# Patient Record
Sex: MALE | Race: WHITE | NOT HISPANIC OR LATINO | Employment: OTHER | URBAN - METROPOLITAN AREA
[De-identification: names, ages, dates, MRNs, and addresses within clinical notes are randomized per-mention and may not be internally consistent; named-entity substitution may affect disease eponyms.]

---

## 2018-05-18 ENCOUNTER — OFFICE VISIT (OUTPATIENT)
Dept: NEUROSURGERY | Facility: CLINIC | Age: 63
End: 2018-05-18

## 2018-05-18 VITALS
WEIGHT: 152 LBS | BODY MASS INDEX: 23.86 KG/M2 | OXYGEN SATURATION: 99 % | HEIGHT: 67 IN | RESPIRATION RATE: 18 BRPM | SYSTOLIC BLOOD PRESSURE: 138 MMHG | DIASTOLIC BLOOD PRESSURE: 76 MMHG | HEART RATE: 90 BPM

## 2018-05-18 DIAGNOSIS — M53.9 MULTILEVEL DEGENERATIVE DISC DISEASE: Primary | ICD-10-CM

## 2018-05-18 DIAGNOSIS — M51.36 BULGING LUMBAR DISC: ICD-10-CM

## 2018-05-18 DIAGNOSIS — F41.9 ANXIETY: ICD-10-CM

## 2018-05-18 PROCEDURE — 99204 OFFICE O/P NEW MOD 45 MIN: CPT | Performed by: NEUROLOGICAL SURGERY

## 2018-05-18 RX ORDER — OXYCODONE HCL 10 MG/1
10 TABLET, FILM COATED, EXTENDED RELEASE ORAL 3 TIMES DAILY PRN
COMMUNITY

## 2018-05-18 RX ORDER — DULOXETIN HYDROCHLORIDE 30 MG/1
30 CAPSULE, DELAYED RELEASE ORAL DAILY
Qty: 30 CAPSULE | Refills: 1 | Status: SHIPPED | OUTPATIENT
Start: 2018-05-18 | End: 2018-06-07 | Stop reason: DRUGHIGH

## 2018-05-18 RX ORDER — MORPHINE SULFATE 100 MG/1
100 TABLET ORAL EVERY 12 HOURS SCHEDULED
COMMUNITY
Start: 2018-05-10

## 2018-05-18 RX ORDER — LISINOPRIL 10 MG/1
10 TABLET ORAL DAILY
COMMUNITY
End: 2018-06-07 | Stop reason: DRUGHIGH

## 2018-05-18 NOTE — PATIENT INSTRUCTIONS
Bring New/Old disc with you to EVERY appointment!!!    Make sure you have the following before your next appointment with .:  -Appointment with    -Another injection with   -CT scan lumbar spine  -Xray of spine (you can have this done the day of your appointment)  -Nerve conduction study

## 2018-05-18 NOTE — PROGRESS NOTES
"  NAME: ARELIS REESE   DOS: 2018  : 1955  PCP: DANIS Flores    Chief Complaint: Back pain   Chief Complaint   Patient presents with   • Back Pain   • Left thigh/leg numbness   • Hx of lumbar discectomy        History of Present Illness:  62 y.o. male   This 62-year-old gentleman with a history of chronic axial back pain for a number of years at times it makes him \"suicidal \"additionally he has pain in the right groin area he denies any symptomatology in the right hip that is significant since his recent hip replacement.  He suffers from chronic depression anxiety issues [place]  He denies any bowel bladder issues proximally 2-3 weeks ago and actually as far as back last summer he is expressing some burning and tingling in his left anterior thigh down in an L2 and L3 poly-radicular type of area he denies any bowel and bladder issues and is absolutely miserable with the pain is worse with standing it's alleviated by laying down    PMHX  Allergies:  No Known Allergies  Medications    Current Outpatient Prescriptions:   •  lisinopril (PRINIVIL,ZESTRIL) 10 MG tablet, Take 10 mg by mouth Daily., Disp: , Rfl:   •  Morphine (MS CONTIN) 100 MG 12 hr tablet, , Disp: , Rfl:   •  oxyCODONE (oxyCONTIN) 10 MG 12 hr tablet, Take 10 mg by mouth Every 12 (Twelve) Hours., Disp: , Rfl:   Past Medical History:  Past Medical History:   Diagnosis Date   • Hypertension    • Low back pain      Past Surgical History:  Past Surgical History:   Procedure Laterality Date   • LUMBAR DISC SURGERY      [facility] ()   • TOTAL HIP ARTHROPLASTY Right 2018    [place], [place]     Social Hx:  Social History   Substance Use Topics   • Smoking status: Former Smoker     Types: Cigarettes     Quit date:    • Smokeless tobacco: Former User   • Alcohol use Yes      Comment: \"Moderately\"     Family Hx:  Family History   Problem Relation Age of Onset   • Heart attack Mother    • Cancer " "Father      Review of Systems:        Review of Systems   Constitutional: Negative for activity change, appetite change, chills, diaphoresis, fatigue, fever and unexpected weight change.   HENT: Negative for congestion, dental problem, drooling, ear discharge, ear pain, facial swelling, hearing loss, mouth sores, nosebleeds, postnasal drip, rhinorrhea, sinus pressure, sneezing, sore throat, tinnitus, trouble swallowing and voice change.    Eyes: Negative for photophobia, pain, discharge, redness, itching and visual disturbance.   Respiratory: Negative for apnea, cough, choking, chest tightness, shortness of breath, wheezing and stridor.    Cardiovascular: Negative for chest pain, palpitations and leg swelling.   Gastrointestinal: Negative for abdominal distention, abdominal pain, anal bleeding, blood in stool, constipation, diarrhea, nausea, rectal pain and vomiting.   Endocrine: Negative for cold intolerance, heat intolerance, polydipsia, polyphagia and polyuria.   Genitourinary: Negative for decreased urine volume, difficulty urinating, dysuria, enuresis, flank pain, frequency, genital sores, hematuria and urgency.   Musculoskeletal: Positive for arthralgias (Left leg pain) and back pain (Pt states his low back pain, and left leg issues get so bad that its \"almost suicidal\"). Negative for gait problem, joint swelling, myalgias, neck pain and neck stiffness.   Skin: Negative for color change, pallor, rash and wound.   Allergic/Immunologic: Negative for environmental allergies, food allergies and immunocompromised state.   Neurological: Positive for numbness (Left thigh/groin. Pt denies any Bowel/Bladder issues). Negative for dizziness, tremors, seizures, syncope, facial asymmetry, speech difficulty, weakness, light-headedness and headaches.   Hematological: Negative for adenopathy. Does not bruise/bleed easily.   Psychiatric/Behavioral: Negative for agitation, behavioral problems, confusion, decreased " concentration, dysphoric mood, hallucinations, self-injury, sleep disturbance and suicidal ideas. The patient is not nervous/anxious and is not hyperactive.    All other systems reviewed and are negative.     I have reviewed this note template and all pertinent parts of the review of systems social, family history, surgical history and medication list      Physical Examination:  Vitals:    05/18/18 1354   BP: 138/76   Pulse: 90   Resp: 18   SpO2: 99%      General Appearance:   Well developed, well nourished, well groomed, alert, and cooperative.  Neurological examination:  Neurologic Exam  Vital signs were reviewed and documented in the chart  Patient appeared in good neurologic function with normal comprehension fluent speech  Mood and affect are normal  Sense of smell deferred    Pupils symmetric equally reactive funduscopic exam not visualized   Visual fields intact to confrontation  Extraocular movements intact  Face motor function is symmetric  Facial sensations normal  Hearing intact to finger rub hearing intact to finger rub  Tongue is midline  Palate symmetric  Swallowing normal  Shoulder shrug normal    Muscle bulk and tone normal  5 out of 5 strength no motor drift  Gait normal intact  Negative Romberg  No clonus long tract signs or myelopathy    Reflexes symmetric decreased knee reflexes  No edema noted and extremities skin appears normal    Straight leg raise sign absent  No signs of intrinsic hip dysfunction  Back is without any lesions or abnormality  Feet are warm and well perfused        Review of Imaging/DATA:  His MRI personally it shows intraforaminal compression with some scoliotic curvature at multiple levels  Diagnoses/Plan:    Mr. Veliz is a 62 y.o. male   This gentleman is suffering from significant intraforaminal disc disease predominantly at 2/3and 3 4 worse on the right side he has severe canal stenosis prior history of lumbar discectomy L3 4 he also has intraforaminal disease at 5 one  on the right probably.    I had an extensive 45 minutes to an hour discussion with him about managing chronic pain.  He's currently taking oral opioids I've recommended her a referral to a pain psychologist I don't think he is truly suicidal in discussions with him but I do think at times he finds himself hopeless dealing with her chronic pain and right him for some Cymbalta and instructed him on how to take it he is taken in the past by report will get a set a long cassette films lumbar flexion extension films as well as a CT scan of lumbar spine and we'll see him back I'll also like him to get evaluated and have a series of lumbar epidural steroid blocks in the meanwhile

## 2018-05-23 ENCOUNTER — HOSPITAL ENCOUNTER (OUTPATIENT)
Dept: GENERAL RADIOLOGY | Facility: HOSPITAL | Age: 63
Discharge: HOME | End: 2018-05-23
Attending: NEUROLOGICAL SURGERY

## 2018-05-23 ENCOUNTER — HOSPITAL ENCOUNTER (OUTPATIENT)
Dept: CT IMAGING | Facility: HOSPITAL | Age: 63
Discharge: HOME | End: 2018-05-23
Attending: NEUROLOGICAL SURGERY | Admitting: NEUROLOGICAL SURGERY

## 2018-05-23 DIAGNOSIS — F41.9 ANXIETY: ICD-10-CM

## 2018-05-23 DIAGNOSIS — M53.9 MULTILEVEL DEGENERATIVE DISC DISEASE: ICD-10-CM

## 2018-05-23 DIAGNOSIS — M51.36 BULGING LUMBAR DISC: ICD-10-CM

## 2018-05-23 PROCEDURE — 72120 X-RAY BEND ONLY L-S SPINE: CPT

## 2018-05-23 PROCEDURE — 72082 X-RAY EXAM ENTIRE SPI 2/3 VW: CPT

## 2018-05-23 PROCEDURE — 72131 CT LUMBAR SPINE W/O DYE: CPT

## 2018-05-25 ENCOUNTER — OFFICE VISIT (OUTPATIENT)
Dept: PSYCHIATRY | Facility: CLINIC | Age: 63
End: 2018-05-25

## 2018-05-25 DIAGNOSIS — F41.1 GAD (GENERALIZED ANXIETY DISORDER): Primary | ICD-10-CM

## 2018-05-25 PROCEDURE — 90791 PSYCH DIAGNOSTIC EVALUATION: CPT | Performed by: PSYCHOLOGIST

## 2018-05-25 NOTE — PROGRESS NOTES
"PROGRESS NOTE    Data:  Martin Veliz is a 62 y.o. male who met with the undersigned for a scheduled individual outpatient therapy session from 10:00 - 10:55am.      Clinical Maneuvering/Intervention:      Pt talked about struggling with chronic pain. He explained that laying flat seems to be the only time he can be out of pain but he has numerous instances of moving and then sparking intense pain. He was very talkative in session, articulate, and forthcoming with personal information. A psychological evaluation was conducted in order to assess past and current level of functioning. Areas assessed included, but were not limited to: perception of social support, perception of ability to face and deal with challenges in life (positive functioning), anxiety symptoms, depressive symptoms, perspective on beliefs/belief system, coping skills for stress, intelligence level, etc. Therapeutic rapport was established. His pain experience and then later, his plan of action to better the quality of his life were discussed in session. Feelings were processed and validated in order to help ground him as he jumped from issue to issue. Interventions conducted today were geared towards pain symptom management, identifying options to improve his life when at first there did not seem to be a way, and deepening sense of purpose in life. A plan of action was articulated and then clarified again in order to empower the pt to feel more in control of his life and diminish anxiety. The notion of \"attacking the issue [chronic pain] from all sides\" was discussed. Education was also provided as to the nature of counseling and what to expect in subsequent sessions. A treatment plan was initiated tailored to meeting pt’s presenting needs. The pt was encouraged to return for additional sessions and agreed to do so.      Mental Status Exam  Hygiene:  good  Dress: normal  Attitude:  Cooperative and proactive  Motor Activity: normal  Speech: " "pressured  Mood:  ancious  Affect:  congruent  Thought Processes: normal  Thought Content:  normal  Suicidal Thoughts:  not endorsed  Homicidal Thoughts:  not endorsed  Crisis Safety Plan: not needed   Hallucinations:  none      Patient's Support Network Includes:  family, friends      Progress toward goal: there is evidence to suggest that he is taking measures to improve the quality of her life including seeking pain treatment and help addressing physical problems. He struggles to maintain anxiety, but is learning ways to do this.      Functional Status: moderate      Prognosis: good with pain treatment/medical procedures to diminish his pain and therapy    Assessment      The pt presented as highly anxious and to be struggling to see hope at times, that his physical health will improve. He is a good candidate for counseling as therapeutic rapport was initiated, he responded positively to interventions today, and he seems open to continue with counseling (at least after receiving additional medical care). He seems determined to get answers to his pain problems and to receive medical treatment before continuing with counseling. He has a history of depression and skills were taught to him today in order for him to use and prevent relapse of depressive symptoms.        Plan      In order to diminish symptoms of anxiety, the pt will work the \"plan of action\" identified today: 1. Follow up with his surgeon in order to receive a diagnosis/treatment of pain/other problems, 2. Continue with being particularly careful in the meantime not overdoing it in physical tasks but still staying physically active in a responsible sense/paying attention to his body (ongoing), 3. Seek pain treatment as advised by his physicians, and 4. Continue with counseling in order to learn coping skills for anxiety and ways to prevent relapse of depression (in the next few weeks or sooner).     Anita Giron, PhD, LP     "

## 2018-06-07 ENCOUNTER — OFFICE VISIT (OUTPATIENT)
Dept: NEUROSURGERY | Facility: CLINIC | Age: 63
End: 2018-06-07

## 2018-06-07 VITALS
WEIGHT: 150 LBS | BODY MASS INDEX: 23.54 KG/M2 | HEIGHT: 67 IN | TEMPERATURE: 97.9 F | SYSTOLIC BLOOD PRESSURE: 122 MMHG | DIASTOLIC BLOOD PRESSURE: 64 MMHG

## 2018-06-07 DIAGNOSIS — M54.16 LUMBAR RADICULOPATHY: ICD-10-CM

## 2018-06-07 DIAGNOSIS — M48.061 SPINAL STENOSIS OF LUMBAR REGION WITHOUT NEUROGENIC CLAUDICATION: Primary | ICD-10-CM

## 2018-06-07 PROCEDURE — 99214 OFFICE O/P EST MOD 30 MIN: CPT | Performed by: NEUROLOGICAL SURGERY

## 2018-06-07 RX ORDER — LISINOPRIL 40 MG/1
40 TABLET ORAL DAILY
Refills: 1 | COMMUNITY
Start: 2018-05-31

## 2018-06-07 RX ORDER — DULOXETIN HYDROCHLORIDE 60 MG/1
60 CAPSULE, DELAYED RELEASE ORAL DAILY
Qty: 30 CAPSULE | Refills: 3 | Status: ON HOLD | OUTPATIENT
Start: 2018-06-07 | End: 2018-07-20

## 2018-06-07 NOTE — PATIENT INSTRUCTIONS
If injections do not help with the pain, please give Jessica a call and we will set you up for a myelogram.

## 2018-06-07 NOTE — PROGRESS NOTES
"  NAME: ARELIS REESE   DOS: 2018  : 1955  PCP: DANIS Flores    Chief Complaint: Follow-up low back pain   Chief Complaint   Patient presents with   • Back Pain     f/u CT/XRAY/PSYCH/EMG/PAIN [facility]       History of Present Illness:  62 y.o. male   62-year-old gentleman with a history of chronic axial back pain with apparently a more acute sounding left L4 perhaps L5 type of disc issue today more previously his left paraspinal L2-L3 type of pain he's had a prior left L3 4 lumbar discectomy    He has seen our pain psychologist and I reviewed the results of the study he was relatively unremarkable, he has not had any lumbar injections recently, he has been on the Cymbalta which seems to be helping with some chronicity of his lumbar spinal issues that he still complaining of daily left lower extremity pain    PMHX  Allergies:  No Known Allergies  Medications    Current Outpatient Prescriptions:   •  DULoxetine (CYMBALTA) 30 MG capsule, Take 1 capsule by mouth Daily., Disp: 30 capsule, Rfl: 1  •  lisinopril (PRINIVIL,ZESTRIL) 40 MG tablet, Take 40 mg by mouth Daily., Disp: , Rfl: 1  •  Morphine (MS CONTIN) 100 MG 12 hr tablet, , Disp: , Rfl:   •  oxyCODONE (oxyCONTIN) 10 MG 12 hr tablet, Take 10 mg by mouth Every 12 (Twelve) Hours., Disp: , Rfl:   Past Medical History:  Past Medical History:   Diagnosis Date   • Hypertension    • Low back pain      Past Surgical History:  Past Surgical History:   Procedure Laterality Date   • LUMBAR DISC SURGERY      [facility] ()   • TOTAL HIP ARTHROPLASTY Right 2018    [place], [place]     Social Hx:  Social History   Substance Use Topics   • Smoking status: Former Smoker     Types: Cigarettes     Quit date:    • Smokeless tobacco: Former User   • Alcohol use Yes      Comment: \"Moderately\"     Family Hx:  Family History   Problem Relation Age of Onset   • Heart attack Mother    • Cancer Father      Review of Systems:        Review of " Systems   Constitutional: Negative for activity change, appetite change, chills, diaphoresis, fatigue, fever and unexpected weight change.   HENT: Negative for congestion, dental problem, drooling, ear discharge, ear pain, facial swelling, hearing loss, mouth sores, nosebleeds, postnasal drip, rhinorrhea, sinus pressure, sneezing, sore throat, tinnitus, trouble swallowing and voice change.    Eyes: Negative for photophobia, pain, discharge, redness, itching and visual disturbance.   Respiratory: Negative for apnea, cough, choking, chest tightness, shortness of breath, wheezing and stridor.    Cardiovascular: Negative for chest pain, palpitations and leg swelling.   Gastrointestinal: Negative for abdominal distention, abdominal pain, anal bleeding, blood in stool, constipation, diarrhea, nausea, rectal pain and vomiting.   Endocrine: Negative for cold intolerance, heat intolerance, polydipsia, polyphagia and polyuria.   Genitourinary: Negative for decreased urine volume, difficulty urinating, dysuria, enuresis, flank pain, frequency, genital sores, hematuria and urgency.   Musculoskeletal: Negative for arthralgias, back pain, gait problem, joint swelling, myalgias, neck pain and neck stiffness.   Skin: Negative for color change, pallor, rash and wound.   Allergic/Immunologic: Negative for environmental allergies, food allergies and immunocompromised state.   Neurological: Negative for dizziness, tremors, seizures, syncope, facial asymmetry, speech difficulty, weakness, light-headedness, numbness and headaches.   Hematological: Negative for adenopathy. Does not bruise/bleed easily.   Psychiatric/Behavioral: Negative for agitation, behavioral problems, confusion, decreased concentration, dysphoric mood, hallucinations, self-injury, sleep disturbance and suicidal ideas. The patient is not nervous/anxious and is not hyperactive.    All other systems reviewed and are negative.     I have reviewed this note template and  all pertinent parts of the review of systems social, family history, surgical history and medication list      Physical Examination:  Vitals:    06/07/18 1249   BP: 122/64   Temp: 97.9 °F (36.6 °C)      General Appearance:   Well developed, well nourished, well groomed, alert, and cooperative.  Neurological examination:  Neurologic Exam  He is awake alert and follows commands he appears today very stable    He has good strength in his lower extremities with no weakness  Overall exam is unchanged well-healed incision with left paraspinal tenderness at the top of the SI region is no straight leg raise sign no signs of intrinsic hip dysfunction  Review of Imaging/DATA:    Nathalia reviewed his MRI again shows pretty aggressive L2-3 and L3 4 intraforaminal disease is a lot of far lateral components in the probably is widening of the facet complex at L4 5    His CT scan shows diffuse calcification from this    EMG shows L4-L5 radiculopathy lumbar flexion extension shows no evidence of significant listhesis  Mr. Veliz is a 62 y.o. male     He presents with a complex history of axial back pain left-sided lower extremity symptomatology predominant and I've had an extensive at least 30 minute discussion with him about the pathway towards how surgery may help him.  From my standpoint I recommended ongoing interventional pain management therapy I see nothing that should make him paralyzed I recommended that he go back to a see Vladimir to receive some lumbar epidural block said be interested in the location perhaps L4 5 transforaminal versus an L3 transforaminal block to see if this would help.    Also in consideration is for a implantable pain pump versus a spinal stimulator either these are reasonable if these 2 options do not provide the relief needed I recommended a lumbar myelogram I explained to him that we can make surgical decisions regarding the results of this study I also told him that I welcomed second opinions  regarding reconstructive options if we got to that point as L2 to S1 fusions are relatively significant surgeries associated with a modest complication rate and variable surgery outcomes.  I  The risks benefits and expected outcome we'll see him back with a myelogram if he needs to proceed with any sort of surgical care.

## 2018-07-17 ENCOUNTER — PREP FOR SURGERY (OUTPATIENT)
Dept: OTHER | Facility: HOSPITAL | Age: 63
End: 2018-07-17

## 2018-07-17 DIAGNOSIS — M54.42 CHRONIC MIDLINE LOW BACK PAIN WITH LEFT-SIDED SCIATICA: Primary | ICD-10-CM

## 2018-07-17 DIAGNOSIS — M51.36 BULGING LUMBAR DISC: ICD-10-CM

## 2018-07-17 DIAGNOSIS — M54.16 LUMBAR RADICULOPATHY: ICD-10-CM

## 2018-07-17 DIAGNOSIS — M48.061 SPINAL STENOSIS OF LUMBAR REGION WITHOUT NEUROGENIC CLAUDICATION: Primary | ICD-10-CM

## 2018-07-17 DIAGNOSIS — G89.29 CHRONIC MIDLINE LOW BACK PAIN WITH LEFT-SIDED SCIATICA: Primary | ICD-10-CM

## 2018-07-17 DIAGNOSIS — M53.9 MULTILEVEL DEGENERATIVE DISC DISEASE: ICD-10-CM

## 2018-07-20 ENCOUNTER — APPOINTMENT (OUTPATIENT)
Dept: CT IMAGING | Facility: HOSPITAL | Age: 63
End: 2018-07-20

## 2018-07-20 ENCOUNTER — HOSPITAL ENCOUNTER (OUTPATIENT)
Facility: HOSPITAL | Age: 63
Setting detail: HOSPITAL OUTPATIENT SURGERY
Discharge: HOME | End: 2018-07-20
Attending: RADIOLOGY | Admitting: NEUROLOGICAL SURGERY

## 2018-07-20 VITALS
RESPIRATION RATE: 18 BRPM | OXYGEN SATURATION: 97 % | DIASTOLIC BLOOD PRESSURE: 93 MMHG | SYSTOLIC BLOOD PRESSURE: 153 MMHG | WEIGHT: 151.01 LBS | HEIGHT: 68 IN | HEART RATE: 68 BPM | BODY MASS INDEX: 22.89 KG/M2 | TEMPERATURE: 98.6 F

## 2018-07-20 DIAGNOSIS — M51.36 BULGING LUMBAR DISC: ICD-10-CM

## 2018-07-20 DIAGNOSIS — M48.061 SPINAL STENOSIS OF LUMBAR REGION WITHOUT NEUROGENIC CLAUDICATION: ICD-10-CM

## 2018-07-20 DIAGNOSIS — M54.16 LUMBAR RADICULOPATHY: ICD-10-CM

## 2018-07-20 DIAGNOSIS — M53.9 MULTILEVEL DEGENERATIVE DISC DISEASE: ICD-10-CM

## 2018-07-20 PROCEDURE — 72132 CT LUMBAR SPINE W/DYE: CPT

## 2018-07-20 PROCEDURE — 0 IOPAMIDOL 41 % SOLUTION: Performed by: RADIOLOGY

## 2018-07-20 PROCEDURE — 62304 MYELOGRAPHY LUMBAR INJECTION: CPT | Performed by: RADIOLOGY

## 2018-07-20 RX ORDER — LIDOCAINE HYDROCHLORIDE 10 MG/ML
INJECTION, SOLUTION INFILTRATION; PERINEURAL AS NEEDED
Status: DISCONTINUED | OUTPATIENT
Start: 2018-07-20 | End: 2018-07-20 | Stop reason: HOSPADM

## 2018-07-20 NOTE — H&P
"NAME: EVELYNE REESE  : 1955  PCP: DANIS Flores  Attending MD: Robetr Mullins MD    Date of Admission:  2018  Date of Service: 2018    History of Present Illness:  62 y.o.  male with prior L3/4 discectomy.  He has chronic axial pain and more acute left sided L4-L5 radiculopathy.    Past Medical History:  Past Medical History:   Diagnosis Date   • Hypertension    • Low back pain        Past Surgical History:  Past Surgical History:   Procedure Laterality Date   • LUMBAR DISC SURGERY      [facility] ()   • TOTAL HIP ARTHROPLASTY Right 2018    [place], [place]         Medications  Prescriptions Prior to Admission   Medication Sig Dispense Refill Last Dose   • DULoxetine (CYMBALTA) 60 MG capsule Take 1 capsule by mouth Daily. 30 capsule 3    • lisinopril (PRINIVIL,ZESTRIL) 40 MG tablet Take 40 mg by mouth Daily.  1 Taking   • Morphine (MS CONTIN) 100 MG 12 hr tablet    Taking   • oxyCODONE (oxyCONTIN) 10 MG 12 hr tablet Take 10 mg by mouth Every 12 (Twelve) Hours.   Taking       Allergies:  No Known Allergies    Social Hx:  Social History     Social History   • Marital status:      Spouse name: N/A   • Number of children: N/A   • Years of education: N/A     Occupational History   • Not on file.     Social History Main Topics   • Smoking status: Former Smoker     Types: Cigarettes     Quit date:    • Smokeless tobacco: Former User   • Alcohol use Yes      Comment: \"Moderately\"   • Drug use: No   • Sexual activity: Defer     Other Topics Concern   • Not on file     Social History Narrative   • No narrative on file       Family Hx:  Family History   Problem Relation Age of Onset   • Heart attack Mother    • Cancer Father        Review of Imaging:  No new imaging    Laboratory Result:  No results found for: WBC, HGB, HCT, MCV, PLT  No results found for: GLUCOSE, CALCIUM, NA, K, CO2, CL, BUN, CREATININE, EGFRIFAFRI, EGFRIFNONA, BCR, ANIONGAP  No results " found for: HGBA1C  No results found for: CHOL, CHLPL, TRIG, HDL, LDL, LDLDIRECT    Physical Examination:  There were no vitals filed for this visit.     General Appearance:   Well developed, well nourished, well groomed, alert, and cooperative.  Cardiovascular: Regular rate and rhythm. No carotid bruits    Neurological examination:  Neurologic Exam  He is awake alert and follows commands he appears today very stable     He has good strength in his lower extremities with no weakness  Overall exam is unchanged well-healed incision with left paraspinal tenderness at the top of the SI region is no straight leg raise sign no signs of intrinsic hip dysfunction    Diagnoses/Plan:    Mr. Veliz is a 62 y.o. male complex history of axial back pain and left-sided lower extremity radiculopathy.  He presents for lumbar myelography to evaluate his radicular complaints for possible intervention

## 2018-07-26 ENCOUNTER — OFFICE VISIT (OUTPATIENT)
Dept: NEUROSURGERY | Facility: CLINIC | Age: 63
End: 2018-07-26

## 2018-07-26 VITALS
DIASTOLIC BLOOD PRESSURE: 88 MMHG | SYSTOLIC BLOOD PRESSURE: 144 MMHG | BODY MASS INDEX: 22.88 KG/M2 | HEIGHT: 68 IN | TEMPERATURE: 97.2 F | WEIGHT: 151 LBS

## 2018-07-26 DIAGNOSIS — M53.9 MULTILEVEL DEGENERATIVE DISC DISEASE: Primary | ICD-10-CM

## 2018-07-26 DIAGNOSIS — M48.061 SPINAL STENOSIS OF LUMBAR REGION WITHOUT NEUROGENIC CLAUDICATION: ICD-10-CM

## 2018-07-26 PROCEDURE — 99214 OFFICE O/P EST MOD 30 MIN: CPT | Performed by: NEUROLOGICAL SURGERY

## 2018-07-26 NOTE — PROGRESS NOTES
"  NAME: EVELYNE REESE   DOS: 2018  : 1955  PCP: DANIS Flores    Chief Complaint:  Back and left leg pain  Chief Complaint   Patient presents with   • F/u Myelo       History of Present Illness:  62 y.o. male     62-year-old well-known to maintain follow-up after myelogram still with back and left lower pain the pain radiates down the leg and an L2-L3 and L4 poly-radicular pattern he has neurogenic claudication with pain alleviated by rest she denies any other symptomatology.  He's had injections that were temporary nothing that sustained Cymbalta he has taken himself off of that it did help shortly but has not had and during relief he denies any other symptomatology and is here for evaluation he is on chronic opioids  PMHX  Allergies:  No Known Allergies  Medications    Current Outpatient Prescriptions:   •  lisinopril (PRINIVIL,ZESTRIL) 40 MG tablet, Take 40 mg by mouth Daily., Disp: , Rfl: 1  •  Morphine (MS CONTIN) 100 MG 12 hr tablet, Take 100 mg by mouth Every 12 (Twelve) Hours., Disp: , Rfl:   •  oxyCODONE (oxyCONTIN) 10 MG 12 hr tablet, Take 10 mg by mouth 3 (Three) Times a Day As Needed., Disp: , Rfl:   Past Medical History:  Past Medical History:   Diagnosis Date   • Hypertension    • Low back pain      Past Surgical History:  Past Surgical History:   Procedure Laterality Date   • LUMBAR DISC SURGERY      [facility] ()   • [place] MYELOGRAPHY VIA LUMBAR INJECT RS&I LUMBOSACRAL N/A 2018    Procedure: IR myelogram, lumbar;  Surgeon: Robert Mullins MD;  Location: [facility] INVASIVE LOCATION;  Service: Interventional Radiology   • TOTAL HIP ARTHROPLASTY Right 2018    [place], [place]     Social Hx:  Social History   Substance Use Topics   • Smoking status: Former Smoker     Types: Cigarettes     Quit date:    • Smokeless tobacco: Former User   • Alcohol use Yes      Comment: \"Moderately\"     Family Hx:  Family History   Problem Relation Age of Onset   • " Heart attack Mother    • Cancer Father      Review of Systems:        Review of Systems   Constitutional: Negative for activity change, appetite change, chills, diaphoresis, fatigue, fever and unexpected weight change.   HENT: Negative for congestion, dental problem, drooling, ear discharge, ear pain, facial swelling, hearing loss, mouth sores, nosebleeds, postnasal drip, rhinorrhea, sinus pressure, sneezing, sore throat, tinnitus, trouble swallowing and voice change.    Eyes: Negative for photophobia, pain, discharge, redness, itching and visual disturbance.   Respiratory: Negative for apnea, cough, choking, chest tightness, shortness of breath, wheezing and stridor.    Cardiovascular: Negative for chest pain, palpitations and leg swelling.   Gastrointestinal: Negative for abdominal distention, abdominal pain, anal bleeding, blood in stool, constipation, diarrhea, nausea, rectal pain and vomiting.   Endocrine: Negative for cold intolerance, heat intolerance, polydipsia, polyphagia and polyuria.   Genitourinary: Negative for decreased urine volume, difficulty urinating, dysuria, enuresis, flank pain, frequency, genital sores, hematuria and urgency.   Musculoskeletal: Positive for back pain. Negative for arthralgias, gait problem, joint swelling, myalgias, neck pain and neck stiffness.   Skin: Negative for color change, pallor, rash and wound.   Allergic/Immunologic: Negative for environmental allergies, food allergies and immunocompromised state.   Neurological: Positive for weakness. Negative for dizziness, tremors, seizures, syncope, facial asymmetry, speech difficulty, light-headedness, numbness and headaches.   Hematological: Negative for adenopathy. Does not bruise/bleed easily.   Psychiatric/Behavioral: Positive for decreased concentration. Negative for agitation, behavioral problems, confusion, dysphoric mood, hallucinations, self-injury, sleep disturbance and suicidal ideas. The patient is not  nervous/anxious and is not hyperactive.    All other systems reviewed and are negative.       I have reviewed this note template and all pertinent parts of the review of systems social, family history, surgical history and medication list    Physical Examination:  Vitals:    07/26/18 1522   BP: 144/88   Temp: 97.2 °F (36.2 °C)      General Appearance:   Well developed, well nourished, well groomed, alert, and cooperative.  Neurological examination:  Neurologic Exam  His exam is stable no signs of intrinsic hip dysfunction he deathly has pain in the left lower extremity    Review of Imaging/DATA:  I reviewed his MRI myelogram etc.  Diagnoses/Plan:    Mr. Veliz is a 62 y.o. male   He has a complicated history of chronic pain he's on presurgical opioids that had extensive discussions with him for my standpoint I think he is a candidate for surgery I think he needs intraforaminal decompression at L2-3 and L3 4 he does have right-sided intraforaminal disease which could be left alone I think at the minimum I did recommend a 2 level decompression 234 lumbar interbody fusion with instrumentation of the left side spent at least an hour with him talking about the risk factors of the surgery and that he does have intraforaminal disease down below all reviewed his films a bit further but he may be a candidate to tie this in and down to S1 at some point, the initial option fail  The reoperation rate from complex surgeries like this I encouraged him to get a secondary opinion prior to partaking in any surgery and I recommended visiting Dr. Paz at  just to see what he thinks of this gentleman's complexity at all think his SVA is that positive and so hopefully I think most of his disease is interforaminal and a short segment fusion I expand the risks benefits and expected outcome we will see him back with a family member who could help him after surgery.

## 2018-07-31 ENCOUNTER — TELEPHONE (OUTPATIENT)
Dept: NEUROSURGERY | Facility: CLINIC | Age: 63
End: 2018-07-31

## 2018-09-24 ENCOUNTER — OFFICE VISIT (OUTPATIENT)
Dept: NEUROSURGERY | Facility: CLINIC | Age: 63
End: 2018-09-24

## 2018-09-24 ENCOUNTER — PREP FOR SURGERY (OUTPATIENT)
Dept: OTHER | Facility: HOSPITAL | Age: 63
End: 2018-09-24

## 2018-09-24 DIAGNOSIS — M41.20 SCOLIOSIS (AND KYPHOSCOLIOSIS), IDIOPATHIC: Primary | ICD-10-CM

## 2018-09-24 DIAGNOSIS — M48.061 SPINAL STENOSIS OF LUMBAR REGION WITHOUT NEUROGENIC CLAUDICATION: Primary | ICD-10-CM

## 2018-09-24 PROCEDURE — 99213 OFFICE O/P EST LOW 20 MIN: CPT | Performed by: NEUROLOGICAL SURGERY

## 2018-09-24 RX ORDER — ACETAMINOPHEN 500 MG
1000 TABLET ORAL ONCE
Status: CANCELLED | OUTPATIENT
Start: 2018-09-24 | End: 2018-09-24

## 2018-09-24 RX ORDER — OXYCODONE HCL 10 MG/1
10 TABLET, FILM COATED, EXTENDED RELEASE ORAL ONCE
Status: CANCELLED | OUTPATIENT
Start: 2018-09-24 | End: 2018-09-24

## 2018-09-24 RX ORDER — FAMOTIDINE 20 MG/1
20 TABLET, FILM COATED ORAL
Status: CANCELLED | OUTPATIENT
Start: 2018-09-24

## 2018-09-24 RX ORDER — CHLORHEXIDINE GLUCONATE 4 G/100ML
SOLUTION TOPICAL
Qty: 120 ML | Refills: 0 | Status: SHIPPED | OUTPATIENT
Start: 2018-09-24 | End: 2018-10-20 | Stop reason: HOSPADM

## 2018-09-24 RX ORDER — PREGABALIN 75 MG/1
75 CAPSULE ORAL ONCE
Status: CANCELLED | OUTPATIENT
Start: 2018-09-24 | End: 2018-09-24

## 2018-09-24 RX ORDER — CEFAZOLIN SODIUM 2 G/100ML
2 INJECTION, SOLUTION INTRAVENOUS ONCE
Status: CANCELLED | OUTPATIENT
Start: 2018-09-24 | End: 2018-09-24

## 2018-09-24 RX ORDER — IBUPROFEN 800 MG/1
800 TABLET ORAL ONCE
Status: CANCELLED | OUTPATIENT
Start: 2018-09-24 | End: 2018-09-24

## 2018-09-24 RX ORDER — SODIUM CHLORIDE, SODIUM LACTATE, POTASSIUM CHLORIDE, CALCIUM CHLORIDE 600; 310; 30; 20 MG/100ML; MG/100ML; MG/100ML; MG/100ML
9 INJECTION, SOLUTION INTRAVENOUS CONTINUOUS
Status: CANCELLED | OUTPATIENT
Start: 2018-09-24

## 2018-09-24 NOTE — PROGRESS NOTES
"  NAME: EVELYNE REESE   DOS: 2018  : 1955  PCP: Kelsey Shah APRN    Chief Complaint:  No chief complaint on file.      History of Present Illness:  63 y.o. male   Follow-up back pain and left leg pain    PMHX  Allergies:  No Known Allergies  Medications    Current Outpatient Prescriptions:   •  lisinopril (PRINIVIL,ZESTRIL) 40 MG tablet, Take 40 mg by mouth Daily., Disp: , Rfl: 1  •  Morphine (MS CONTIN) 100 MG 12 hr tablet, Take 100 mg by mouth Every 12 (Twelve) Hours., Disp: , Rfl:   •  oxyCODONE (oxyCONTIN) 10 MG 12 hr tablet, Take 10 mg by mouth 3 (Three) Times a Day As Needed., Disp: , Rfl:   Past Medical History:  Past Medical History:   Diagnosis Date   • Hypertension    • Low back pain      Past Surgical History:  Past Surgical History:   Procedure Laterality Date   • LUMBAR DISC SURGERY      [facility] ()   • [place] MYELOGRAPHY VIA LUMBAR INJECT RS&I LUMBOSACRAL N/A 2018    Procedure: IR myelogram, lumbar;  Surgeon: Robert Mullins MD;  Location: [facility] INVASIVE LOCATION;  Service: Interventional Radiology   • TOTAL HIP ARTHROPLASTY Right 2018    [place], [place]     Social Hx:  Social History   Substance Use Topics   • Smoking status: Former Smoker     Types: Cigarettes     Quit date:    • Smokeless tobacco: Former User   • Alcohol use Yes      Comment: \"Moderately\"     Family Hx:  Family History   Problem Relation Age of Onset   • Heart attack Mother    • Cancer Father      Review of Systems:        Review of Systems         Physical Examination:  There were no vitals filed for this visit.   General Appearance:   Well developed, well nourished, well groomed, alert, and cooperative.  Neurological examination:  Neurologic Exam  Exam is intact he's 5 out of 5 no evidence of weakness    Review of Imaging/DATA:  Reviewed all his scans again personally he has intraforaminal disease LEFT L2-3 L3 4  Diagnoses/Plan:    Mr. Reese is a 63 y.o. male   I " discussed with the gentleman again the care he has received a second opinion given the complexities of his spine and for my standpoint I recommended an L2-3 for lumbar fusion he does have intraforaminal disease on the right at the L4 5 and L5-S1 however he is currently asymptomatic from this.  I told him that there is about a 20% chance she'll be back within 5 years for extension of his lumbar construct but the risk and benefits would dictate a shorter surgery if we stick to the 2 levels in his predominantly left sided symptoms.  We'll make arrangements for a 2 level lumbar interbody fusion L2-3 for I've explained the risks benefits and expected outcome and counseled him on his recovery

## 2018-09-25 ENCOUNTER — HOSPITAL ENCOUNTER (OUTPATIENT)
Facility: HOSPITAL | Age: 63
Setting detail: SURGERY ADMIT
End: 2018-09-25
Attending: NEUROLOGICAL SURGERY | Admitting: NEUROLOGICAL SURGERY

## 2018-09-25 PROBLEM — M41.20 SCOLIOSIS (AND KYPHOSCOLIOSIS), IDIOPATHIC: Status: ACTIVE | Noted: 2018-09-25

## 2018-10-08 ENCOUNTER — APPOINTMENT (OUTPATIENT)
Dept: PREADMISSION TESTING | Facility: HOSPITAL | Age: 63
End: 2018-10-08

## 2018-10-15 ENCOUNTER — APPOINTMENT (OUTPATIENT)
Dept: PREADMISSION TESTING | Facility: HOSPITAL | Age: 63
End: 2018-10-15

## 2018-10-15 VITALS — WEIGHT: 152.2 LBS | HEIGHT: 68 IN | BODY MASS INDEX: 23.07 KG/M2

## 2018-10-15 DIAGNOSIS — M41.20 SCOLIOSIS (AND KYPHOSCOLIOSIS), IDIOPATHIC: ICD-10-CM

## 2018-10-15 LAB
ANION GAP SERPL CALCULATED.3IONS-SCNC: 10 MMOL/L (ref 3–11)
BUN BLD-MCNC: 21 MG/DL (ref 9–23)
BUN/CREAT SERPL: 22.3 (ref 7–25)
CALCIUM SPEC-SCNC: 9.7 MG/DL (ref 8.7–10.4)
CHLORIDE SERPL-SCNC: 95 MMOL/L (ref 99–109)
CO2 SERPL-SCNC: 23 MMOL/L (ref 20–31)
CREAT BLD-MCNC: 0.94 MG/DL (ref 0.6–1.3)
DEPRECATED RDW RBC AUTO: 43.6 FL (ref 37–54)
ERYTHROCYTE [DISTWIDTH] IN BLOOD BY AUTOMATED COUNT: 13.2 % (ref 11.3–14.5)
GFR SERPL CREATININE-BSD FRML MDRD: 81 ML/MIN/1.73
GLUCOSE BLD-MCNC: 95 MG/DL (ref 70–100)
HBA1C MFR BLD: 5.7 % (ref 4.8–5.6)
HCT VFR BLD AUTO: 42.9 % (ref 38.9–50.9)
HGB BLD-MCNC: 14 G/DL (ref 13.1–17.5)
MCH RBC QN AUTO: 29.4 PG (ref 27–31)
MCHC RBC AUTO-ENTMCNC: 32.6 G/DL (ref 32–36)
MCV RBC AUTO: 90.1 FL (ref 80–99)
MRSA DNA SPEC QL NAA+PROBE: NEGATIVE
PLATELET # BLD AUTO: 334 10*3/MM3 (ref 150–450)
PMV BLD AUTO: 9 FL (ref 6–12)
POTASSIUM BLD-SCNC: 5.2 MMOL/L (ref 3.5–5.5)
RBC # BLD AUTO: 4.76 10*6/MM3 (ref 4.2–5.76)
SODIUM BLD-SCNC: 128 MMOL/L (ref 132–146)
WBC NRBC COR # BLD: 9.08 10*3/MM3 (ref 3.5–10.8)

## 2018-10-15 PROCEDURE — 85027 COMPLETE CBC AUTOMATED: CPT | Performed by: ANESTHESIOLOGY

## 2018-10-15 PROCEDURE — 80048 BASIC METABOLIC PNL TOTAL CA: CPT | Performed by: NEUROLOGICAL SURGERY

## 2018-10-15 PROCEDURE — 36415 COLL VENOUS BLD VENIPUNCTURE: CPT

## 2018-10-15 PROCEDURE — 93010 ELECTROCARDIOGRAM REPORT: CPT | Performed by: INTERNAL MEDICINE

## 2018-10-15 PROCEDURE — 93005 ELECTROCARDIOGRAM TRACING: CPT

## 2018-10-15 PROCEDURE — 83036 HEMOGLOBIN GLYCOSYLATED A1C: CPT | Performed by: ANESTHESIOLOGY

## 2018-10-15 PROCEDURE — 87641 MR-STAPH DNA AMP PROBE: CPT | Performed by: ANESTHESIOLOGY

## 2018-10-15 NOTE — DISCHARGE INSTRUCTIONS

## 2018-10-16 ENCOUNTER — ANESTHESIA EVENT (OUTPATIENT)
Dept: PERIOP | Facility: HOSPITAL | Age: 63
End: 2018-10-16

## 2018-10-17 ENCOUNTER — APPOINTMENT (OUTPATIENT)
Dept: GENERAL RADIOLOGY | Facility: HOSPITAL | Age: 63
End: 2018-10-17
Attending: NEUROLOGICAL SURGERY

## 2018-10-17 ENCOUNTER — APPOINTMENT (OUTPATIENT)
Dept: GENERAL RADIOLOGY | Facility: HOSPITAL | Age: 63
End: 2018-10-17

## 2018-10-17 ENCOUNTER — ANESTHESIA (OUTPATIENT)
Dept: PERIOP | Facility: HOSPITAL | Age: 63
End: 2018-10-17

## 2018-10-17 ENCOUNTER — HOSPITAL ENCOUNTER (INPATIENT)
Facility: HOSPITAL | Age: 63
LOS: 3 days | Discharge: HOME | End: 2018-10-20
Attending: NEUROLOGICAL SURGERY | Admitting: NEUROLOGICAL SURGERY

## 2018-10-17 DIAGNOSIS — Z74.09 IMPAIRED MOBILITY AND ADLS: ICD-10-CM

## 2018-10-17 DIAGNOSIS — Z74.09 IMPAIRED FUNCTIONAL MOBILITY, BALANCE, GAIT, AND ENDURANCE: Primary | ICD-10-CM

## 2018-10-17 DIAGNOSIS — Z78.9 IMPAIRED MOBILITY AND ADLS: ICD-10-CM

## 2018-10-17 PROBLEM — M43.16 SPONDYLOLISTHESIS OF LUMBAR REGION: Status: ACTIVE | Noted: 2018-10-17

## 2018-10-17 PROBLEM — M43.00 ACQUIRED SPONDYLOLYSIS: Status: ACTIVE | Noted: 2018-10-17

## 2018-10-17 PROCEDURE — 76001 HC FLUORO GREATER THAN 1 HOUR: CPT

## 2018-10-17 PROCEDURE — 25010000002 HYDROMORPHONE 1 MG/ML SOLUTION: Performed by: ANESTHESIOLOGY

## 2018-10-17 PROCEDURE — 25010000002 NEOSTIGMINE 10 MG/10ML SOLUTION: Performed by: NURSE ANESTHETIST, CERTIFIED REGISTERED

## 2018-10-17 PROCEDURE — 25010000002 ONDANSETRON PER 1 MG: Performed by: NURSE ANESTHETIST, CERTIFIED REGISTERED

## 2018-10-17 PROCEDURE — C1713 ANCHOR/SCREW BN/BN,TIS/BN: HCPCS | Performed by: NEUROLOGICAL SURGERY

## 2018-10-17 PROCEDURE — 25010000002 FENTANYL CITRATE (PF) 100 MCG/2ML SOLUTION: Performed by: NURSE ANESTHETIST, CERTIFIED REGISTERED

## 2018-10-17 PROCEDURE — 22633 ARTHRD CMBN 1NTRSPC LUMBAR: CPT | Performed by: NEUROLOGICAL SURGERY

## 2018-10-17 PROCEDURE — 25010000002 PHENYLEPHRINE PER 1 ML: Performed by: NURSE ANESTHETIST, CERTIFIED REGISTERED

## 2018-10-17 PROCEDURE — 22633 ARTHRD CMBN 1NTRSPC LUMBAR: CPT | Performed by: PHYSICIAN ASSISTANT

## 2018-10-17 PROCEDURE — 22842 INSERT SPINE FIXATION DEVICE: CPT | Performed by: NEUROLOGICAL SURGERY

## 2018-10-17 PROCEDURE — 25010000002 DEXAMETHASONE SODIUM PHOSPHATE 10 MG/ML SOLUTION 1 ML VIAL: Performed by: NEUROLOGICAL SURGERY

## 2018-10-17 PROCEDURE — 22634 ARTHRD CMBN 1NTRSPC EA ADDL: CPT | Performed by: NEUROLOGICAL SURGERY

## 2018-10-17 PROCEDURE — 22853 INSJ BIOMECHANICAL DEVICE: CPT | Performed by: PHYSICIAN ASSISTANT

## 2018-10-17 PROCEDURE — 25010000002 CEFAZOLIN PER 500 MG: Performed by: NEUROLOGICAL SURGERY

## 2018-10-17 PROCEDURE — 25010000002 HYDROMORPHONE PER 4 MG: Performed by: NURSE ANESTHETIST, CERTIFIED REGISTERED

## 2018-10-17 PROCEDURE — 25010000002 PROPOFOL 1000 MG/ML EMULSION: Performed by: NURSE ANESTHETIST, CERTIFIED REGISTERED

## 2018-10-17 PROCEDURE — 61783 SCAN PROC SPINAL: CPT | Performed by: NEUROLOGICAL SURGERY

## 2018-10-17 PROCEDURE — 22634 ARTHRD CMBN 1NTRSPC EA ADDL: CPT | Performed by: PHYSICIAN ASSISTANT

## 2018-10-17 PROCEDURE — 22853 INSJ BIOMECHANICAL DEVICE: CPT | Performed by: NEUROLOGICAL SURGERY

## 2018-10-17 PROCEDURE — 25010000002 DEXAMETHASONE PER 1 MG: Performed by: NURSE ANESTHETIST, CERTIFIED REGISTERED

## 2018-10-17 PROCEDURE — 0SG10AJ FUSION OF 2 OR MORE LUMBAR VERTEBRAL JOINTS WITH INTERBODY FUSION DEVICE, POSTERIOR APPROACH, ANTERIOR COLUMN, OPEN APPROACH: ICD-10-PCS | Performed by: NEUROLOGICAL SURGERY

## 2018-10-17 PROCEDURE — 25010000002 HYDROMORPHONE 1 MG/ML SOLUTION: Performed by: NEUROLOGICAL SURGERY

## 2018-10-17 PROCEDURE — 22842 INSERT SPINE FIXATION DEVICE: CPT | Performed by: PHYSICIAN ASSISTANT

## 2018-10-17 PROCEDURE — 25010000003 CEFAZOLIN IN DEXTROSE 2-4 GM/100ML-% SOLUTION: Performed by: NEUROLOGICAL SURGERY

## 2018-10-17 PROCEDURE — 76000 FLUOROSCOPY <1 HR PHYS/QHP: CPT

## 2018-10-17 PROCEDURE — 25010000002 BUPRENORPHINE PER 0.1 MG: Performed by: NEUROLOGICAL SURGERY

## 2018-10-17 PROCEDURE — 25010000002 PROPOFOL 10 MG/ML EMULSION: Performed by: NURSE ANESTHETIST, CERTIFIED REGISTERED

## 2018-10-17 PROCEDURE — 25010000002 HYDROMORPHONE PER 4 MG: Performed by: ANESTHESIOLOGY

## 2018-10-17 DEVICE — DBM T42200 2.5CMX10CM 2 EACH GRAFTON MAT
Type: IMPLANTABLE DEVICE | Site: SPINE LUMBAR | Status: FUNCTIONAL
Brand: GRAFTON®AND GRAFTON PLUS®DEMINERALIZED BONE MATRIX (DBM)

## 2018-10-17 DEVICE — WAX BONE HEMO AESCULAP 2.5GM: Type: IMPLANTABLE DEVICE | Site: SPINE LUMBAR | Status: FUNCTIONAL

## 2018-10-17 DEVICE — SCREW 54840015550 CN MAS 5.5X50 CC
Type: IMPLANTABLE DEVICE | Site: SPINE LUMBAR | Status: FUNCTIONAL
Brand: CD HORIZON® SPINAL SYSTEM

## 2018-10-17 DEVICE — CAGE 2660924 WAVE D 9MM X 24MM 6DEG
Type: IMPLANTABLE DEVICE | Site: SPINE LUMBAR | Status: FUNCTIONAL
Brand: WAVE D SPINAL SYSTEM

## 2018-10-17 DEVICE — SEALANT WND FIBRIN TISSEEL VAPOR/HEAT/PREFIL/SYR 10ML: Type: IMPLANTABLE DEVICE | Status: FUNCTIONAL

## 2018-10-17 RX ORDER — CEFAZOLIN SODIUM 2 G/100ML
2 INJECTION, SOLUTION INTRAVENOUS EVERY 8 HOURS
Status: COMPLETED | OUTPATIENT
Start: 2018-10-17 | End: 2018-10-18

## 2018-10-17 RX ORDER — FENTANYL CITRATE 50 UG/ML
50 INJECTION, SOLUTION INTRAMUSCULAR; INTRAVENOUS
Status: DISCONTINUED | OUTPATIENT
Start: 2018-10-17 | End: 2018-10-17

## 2018-10-17 RX ORDER — DEXAMETHASONE SODIUM PHOSPHATE 4 MG/ML
INJECTION, SOLUTION INTRA-ARTICULAR; INTRALESIONAL; INTRAMUSCULAR; INTRAVENOUS; SOFT TISSUE AS NEEDED
Status: DISCONTINUED | OUTPATIENT
Start: 2018-10-17 | End: 2018-10-17 | Stop reason: SURG

## 2018-10-17 RX ORDER — MAGNESIUM HYDROXIDE 1200 MG/15ML
LIQUID ORAL AS NEEDED
Status: DISCONTINUED | OUTPATIENT
Start: 2018-10-17 | End: 2018-10-17 | Stop reason: HOSPADM

## 2018-10-17 RX ORDER — NALOXONE HCL 0.4 MG/ML
0.4 VIAL (ML) INJECTION
Status: DISCONTINUED | OUTPATIENT
Start: 2018-10-17 | End: 2018-10-20 | Stop reason: HOSPADM

## 2018-10-17 RX ORDER — OXYCODONE HYDROCHLORIDE 15 MG/1
30 TABLET, FILM COATED, EXTENDED RELEASE ORAL EVERY 8 HOURS SCHEDULED
Status: DISCONTINUED | OUTPATIENT
Start: 2018-10-17 | End: 2018-10-17

## 2018-10-17 RX ORDER — OXYCODONE HCL 20 MG/1
20 TABLET, FILM COATED, EXTENDED RELEASE ORAL EVERY 8 HOURS SCHEDULED
Status: DISCONTINUED | OUTPATIENT
Start: 2018-10-17 | End: 2018-10-20 | Stop reason: HOSPADM

## 2018-10-17 RX ORDER — SODIUM CHLORIDE 0.9 % (FLUSH) 0.9 %
3 SYRINGE (ML) INJECTION EVERY 12 HOURS SCHEDULED
Status: DISCONTINUED | OUTPATIENT
Start: 2018-10-17 | End: 2018-10-20 | Stop reason: HOSPADM

## 2018-10-17 RX ORDER — SODIUM CHLORIDE, SODIUM LACTATE, POTASSIUM CHLORIDE, CALCIUM CHLORIDE 600; 310; 30; 20 MG/100ML; MG/100ML; MG/100ML; MG/100ML
9 INJECTION, SOLUTION INTRAVENOUS CONTINUOUS PRN
Status: DISCONTINUED | OUTPATIENT
Start: 2018-10-17 | End: 2018-10-18

## 2018-10-17 RX ORDER — MORPHINE SULFATE 100 MG/1
100 TABLET ORAL EVERY 12 HOURS SCHEDULED
Status: DISCONTINUED | OUTPATIENT
Start: 2018-10-17 | End: 2018-10-20 | Stop reason: HOSPADM

## 2018-10-17 RX ORDER — HYDROMORPHONE HYDROCHLORIDE 1 MG/ML
0.5 INJECTION, SOLUTION INTRAMUSCULAR; INTRAVENOUS; SUBCUTANEOUS
Status: DISCONTINUED | OUTPATIENT
Start: 2018-10-17 | End: 2018-10-17 | Stop reason: HOSPADM

## 2018-10-17 RX ORDER — ACETAMINOPHEN 500 MG
1000 TABLET ORAL ONCE
Status: COMPLETED | OUTPATIENT
Start: 2018-10-17 | End: 2018-10-17

## 2018-10-17 RX ORDER — ALPRAZOLAM 0.25 MG/1
0.25 TABLET ORAL 3 TIMES DAILY PRN
Status: DISCONTINUED | OUTPATIENT
Start: 2018-10-17 | End: 2018-10-19

## 2018-10-17 RX ORDER — FENTANYL CITRATE 50 UG/ML
INJECTION, SOLUTION INTRAMUSCULAR; INTRAVENOUS AS NEEDED
Status: DISCONTINUED | OUTPATIENT
Start: 2018-10-17 | End: 2018-10-17 | Stop reason: SURG

## 2018-10-17 RX ORDER — LIDOCAINE HYDROCHLORIDE AND EPINEPHRINE 5; 5 MG/ML; UG/ML
INJECTION, SOLUTION INFILTRATION; PERINEURAL AS NEEDED
Status: DISCONTINUED | OUTPATIENT
Start: 2018-10-17 | End: 2018-10-17 | Stop reason: HOSPADM

## 2018-10-17 RX ORDER — ONDANSETRON 2 MG/ML
INJECTION INTRAMUSCULAR; INTRAVENOUS AS NEEDED
Status: DISCONTINUED | OUTPATIENT
Start: 2018-10-17 | End: 2018-10-17 | Stop reason: SURG

## 2018-10-17 RX ORDER — OXYCODONE AND ACETAMINOPHEN 7.5; 325 MG/1; MG/1
2 TABLET ORAL EVERY 4 HOURS PRN
Status: DISCONTINUED | OUTPATIENT
Start: 2018-10-17 | End: 2018-10-19

## 2018-10-17 RX ORDER — HYDROMORPHONE HYDROCHLORIDE 1 MG/ML
0.5 INJECTION, SOLUTION INTRAMUSCULAR; INTRAVENOUS; SUBCUTANEOUS
Status: COMPLETED | OUTPATIENT
Start: 2018-10-17 | End: 2018-10-17

## 2018-10-17 RX ORDER — METHOCARBAMOL 500 MG/1
1000 TABLET, FILM COATED ORAL 4 TIMES DAILY PRN
Status: DISCONTINUED | OUTPATIENT
Start: 2018-10-17 | End: 2018-10-20 | Stop reason: HOSPADM

## 2018-10-17 RX ORDER — OXYCODONE AND ACETAMINOPHEN 10; 325 MG/1; MG/1
1 TABLET ORAL EVERY 4 HOURS PRN
Status: DISCONTINUED | OUTPATIENT
Start: 2018-10-17 | End: 2018-10-17

## 2018-10-17 RX ORDER — SODIUM CHLORIDE 0.9 % (FLUSH) 0.9 %
1-10 SYRINGE (ML) INJECTION AS NEEDED
Status: DISCONTINUED | OUTPATIENT
Start: 2018-10-17 | End: 2018-10-17

## 2018-10-17 RX ORDER — LISINOPRIL 20 MG/1
40 TABLET ORAL DAILY
Status: DISCONTINUED | OUTPATIENT
Start: 2018-10-18 | End: 2018-10-20 | Stop reason: HOSPADM

## 2018-10-17 RX ORDER — IBUPROFEN 800 MG/1
800 TABLET ORAL ONCE
Status: COMPLETED | OUTPATIENT
Start: 2018-10-17 | End: 2018-10-17

## 2018-10-17 RX ORDER — OXYCODONE HCL 10 MG/1
10 TABLET, FILM COATED, EXTENDED RELEASE ORAL ONCE
Status: COMPLETED | OUTPATIENT
Start: 2018-10-17 | End: 2018-10-17

## 2018-10-17 RX ORDER — ATRACURIUM BESYLATE 10 MG/ML
INJECTION, SOLUTION INTRAVENOUS AS NEEDED
Status: DISCONTINUED | OUTPATIENT
Start: 2018-10-17 | End: 2018-10-17 | Stop reason: SURG

## 2018-10-17 RX ORDER — ACETAMINOPHEN 325 MG/1
650 TABLET ORAL EVERY 4 HOURS PRN
Status: DISCONTINUED | OUTPATIENT
Start: 2018-10-17 | End: 2018-10-20 | Stop reason: HOSPADM

## 2018-10-17 RX ORDER — GLYCOPYRROLATE 0.2 MG/ML
INJECTION INTRAMUSCULAR; INTRAVENOUS AS NEEDED
Status: DISCONTINUED | OUTPATIENT
Start: 2018-10-17 | End: 2018-10-17 | Stop reason: SURG

## 2018-10-17 RX ORDER — LIDOCAINE HYDROCHLORIDE 10 MG/ML
INJECTION, SOLUTION EPIDURAL; INFILTRATION; INTRACAUDAL; PERINEURAL AS NEEDED
Status: DISCONTINUED | OUTPATIENT
Start: 2018-10-17 | End: 2018-10-17 | Stop reason: SURG

## 2018-10-17 RX ORDER — ONDANSETRON 2 MG/ML
4 INJECTION INTRAMUSCULAR; INTRAVENOUS ONCE
Status: DISCONTINUED | OUTPATIENT
Start: 2018-10-17 | End: 2018-10-18

## 2018-10-17 RX ORDER — PROPOFOL 10 MG/ML
VIAL (ML) INTRAVENOUS AS NEEDED
Status: DISCONTINUED | OUTPATIENT
Start: 2018-10-17 | End: 2018-10-17 | Stop reason: SURG

## 2018-10-17 RX ORDER — ONDANSETRON 2 MG/ML
4 INJECTION INTRAMUSCULAR; INTRAVENOUS EVERY 6 HOURS PRN
Status: DISCONTINUED | OUTPATIENT
Start: 2018-10-17 | End: 2018-10-20 | Stop reason: HOSPADM

## 2018-10-17 RX ORDER — NEOSTIGMINE METHYLSULFATE 1 MG/ML
INJECTION, SOLUTION INTRAVENOUS AS NEEDED
Status: DISCONTINUED | OUTPATIENT
Start: 2018-10-17 | End: 2018-10-17 | Stop reason: SURG

## 2018-10-17 RX ORDER — CEFAZOLIN SODIUM 2 G/100ML
2 INJECTION, SOLUTION INTRAVENOUS
Status: COMPLETED | OUTPATIENT
Start: 2018-10-17 | End: 2018-10-17

## 2018-10-17 RX ORDER — OXYCODONE HCL 10 MG/1
20 TABLET, FILM COATED, EXTENDED RELEASE ORAL EVERY 12 HOURS SCHEDULED
Status: DISCONTINUED | OUTPATIENT
Start: 2018-10-17 | End: 2018-10-17

## 2018-10-17 RX ORDER — LIDOCAINE HYDROCHLORIDE 10 MG/ML
0.5 INJECTION, SOLUTION EPIDURAL; INFILTRATION; INTRACAUDAL; PERINEURAL ONCE AS NEEDED
Status: COMPLETED | OUTPATIENT
Start: 2018-10-17 | End: 2018-10-17

## 2018-10-17 RX ORDER — KETAMINE HCL IN 0.9 % NACL 50 MG/5 ML
10 SYRINGE (ML) INTRAVENOUS ONCE
Status: COMPLETED | OUTPATIENT
Start: 2018-10-17 | End: 2018-10-17

## 2018-10-17 RX ORDER — FAMOTIDINE 20 MG/1
20 TABLET, FILM COATED ORAL
Status: DISCONTINUED | OUTPATIENT
Start: 2018-10-17 | End: 2018-10-17

## 2018-10-17 RX ORDER — ONDANSETRON 2 MG/ML
4 INJECTION INTRAMUSCULAR; INTRAVENOUS ONCE AS NEEDED
Status: DISCONTINUED | OUTPATIENT
Start: 2018-10-17 | End: 2018-10-17

## 2018-10-17 RX ORDER — SODIUM CHLORIDE 0.9 % (FLUSH) 0.9 %
3-10 SYRINGE (ML) INJECTION AS NEEDED
Status: DISCONTINUED | OUTPATIENT
Start: 2018-10-17 | End: 2018-10-20 | Stop reason: HOSPADM

## 2018-10-17 RX ORDER — KETAMINE HCL IN 0.9 % NACL 50 MG/5 ML
40 SYRINGE (ML) INTRAVENOUS ONCE
Status: COMPLETED | OUTPATIENT
Start: 2018-10-17 | End: 2018-10-17

## 2018-10-17 RX ORDER — SODIUM CHLORIDE 0.9 % (FLUSH) 0.9 %
3 SYRINGE (ML) INJECTION EVERY 12 HOURS SCHEDULED
Status: DISCONTINUED | OUTPATIENT
Start: 2018-10-17 | End: 2018-10-17

## 2018-10-17 RX ORDER — FIBRINOGEN HUMAN AND THROMBIN HUMAN 90; 500 [IU]/ML; [IU]/ML
SOLUTION TOPICAL AS NEEDED
Status: DISCONTINUED | OUTPATIENT
Start: 2018-10-17 | End: 2018-10-17 | Stop reason: HOSPADM

## 2018-10-17 RX ORDER — TEMAZEPAM 15 MG/1
15 CAPSULE ORAL NIGHTLY PRN
Status: DISCONTINUED | OUTPATIENT
Start: 2018-10-17 | End: 2018-10-20 | Stop reason: HOSPADM

## 2018-10-17 RX ORDER — PREGABALIN 75 MG/1
75 CAPSULE ORAL ONCE
Status: COMPLETED | OUTPATIENT
Start: 2018-10-17 | End: 2018-10-17

## 2018-10-17 RX ADMIN — CEFAZOLIN SODIUM 2 G: 2 INJECTION, SOLUTION INTRAVENOUS at 11:25

## 2018-10-17 RX ADMIN — PREGABALIN 75 MG: 75 CAPSULE ORAL at 10:20

## 2018-10-17 RX ADMIN — OXYCODONE HYDROCHLORIDE AND ACETAMINOPHEN 2 TABLET: 7.5; 325 TABLET ORAL at 18:36

## 2018-10-17 RX ADMIN — PHENYLEPHRINE HYDROCHLORIDE 40 MCG: 10 INJECTION INTRAVENOUS at 13:11

## 2018-10-17 RX ADMIN — FAMOTIDINE 20 MG: 20 TABLET ORAL at 10:20

## 2018-10-17 RX ADMIN — NEOSTIGMINE METHYLSULFATE 3 MG: 1 INJECTION, SOLUTION INTRAVENOUS at 14:48

## 2018-10-17 RX ADMIN — ALPRAZOLAM 0.25 MG: 0.25 TABLET ORAL at 19:20

## 2018-10-17 RX ADMIN — DEXAMETHASONE SODIUM PHOSPHATE 6 MG: 4 INJECTION, SOLUTION INTRAMUSCULAR; INTRAVENOUS at 11:27

## 2018-10-17 RX ADMIN — FENTANYL CITRATE 50 MCG: 50 INJECTION, SOLUTION INTRAMUSCULAR; INTRAVENOUS at 14:54

## 2018-10-17 RX ADMIN — ATRACURIUM BESYLATE 20 MG: 10 INJECTION, SOLUTION INTRAVENOUS at 13:56

## 2018-10-17 RX ADMIN — Medication 40 MG: at 16:31

## 2018-10-17 RX ADMIN — HYDROMORPHONE HYDROCHLORIDE 1 MG: 1 INJECTION, SOLUTION INTRAMUSCULAR; INTRAVENOUS; SUBCUTANEOUS at 17:56

## 2018-10-17 RX ADMIN — PHENYLEPHRINE HYDROCHLORIDE 80 MCG: 10 INJECTION INTRAVENOUS at 12:47

## 2018-10-17 RX ADMIN — OXYCODONE HYDROCHLORIDE 10 MG: 10 TABLET, FILM COATED, EXTENDED RELEASE ORAL at 10:20

## 2018-10-17 RX ADMIN — HYDROMORPHONE HYDROCHLORIDE 0.5 MG: 1 INJECTION, SOLUTION INTRAMUSCULAR; INTRAVENOUS; SUBCUTANEOUS at 15:46

## 2018-10-17 RX ADMIN — HYDROMORPHONE HYDROCHLORIDE 0.5 MG: 1 INJECTION, SOLUTION INTRAMUSCULAR; INTRAVENOUS; SUBCUTANEOUS at 15:52

## 2018-10-17 RX ADMIN — FENTANYL CITRATE 50 MCG: 50 INJECTION, SOLUTION INTRAMUSCULAR; INTRAVENOUS at 11:21

## 2018-10-17 RX ADMIN — HYDROMORPHONE HYDROCHLORIDE 0.5 MG: 1 INJECTION, SOLUTION INTRAMUSCULAR; INTRAVENOUS; SUBCUTANEOUS at 17:44

## 2018-10-17 RX ADMIN — FENTANYL CITRATE 50 MCG: 50 INJECTION, SOLUTION INTRAMUSCULAR; INTRAVENOUS at 15:40

## 2018-10-17 RX ADMIN — SODIUM CHLORIDE, POTASSIUM CHLORIDE, SODIUM LACTATE AND CALCIUM CHLORIDE: 600; 310; 30; 20 INJECTION, SOLUTION INTRAVENOUS at 14:25

## 2018-10-17 RX ADMIN — HYDROMORPHONE HYDROCHLORIDE 0.5 MG: 1 INJECTION, SOLUTION INTRAMUSCULAR; INTRAVENOUS; SUBCUTANEOUS at 15:22

## 2018-10-17 RX ADMIN — PHENYLEPHRINE HYDROCHLORIDE 0.5 MCG/KG/MIN: 10 INJECTION INTRAVENOUS at 13:16

## 2018-10-17 RX ADMIN — ACETAMINOPHEN 1000 MG: 500 TABLET, FILM COATED ORAL at 10:20

## 2018-10-17 RX ADMIN — FENTANYL CITRATE 50 MCG: 50 INJECTION, SOLUTION INTRAMUSCULAR; INTRAVENOUS at 16:16

## 2018-10-17 RX ADMIN — Medication: at 21:25

## 2018-10-17 RX ADMIN — PHENYLEPHRINE HYDROCHLORIDE 80 MCG: 10 INJECTION INTRAVENOUS at 12:32

## 2018-10-17 RX ADMIN — MORPHINE SULFATE 100 MG: 100 TABLET, EXTENDED RELEASE ORAL at 16:45

## 2018-10-17 RX ADMIN — GLYCOPYRROLATE 0.4 MG: 0.2 INJECTION, SOLUTION INTRAMUSCULAR; INTRAVENOUS at 14:48

## 2018-10-17 RX ADMIN — LIDOCAINE HYDROCHLORIDE 50 MG: 10 INJECTION, SOLUTION EPIDURAL; INFILTRATION; INTRACAUDAL; PERINEURAL at 11:21

## 2018-10-17 RX ADMIN — ATRACURIUM BESYLATE 50 MG: 10 INJECTION, SOLUTION INTRAVENOUS at 11:21

## 2018-10-17 RX ADMIN — PHENYLEPHRINE HYDROCHLORIDE 80 MCG: 10 INJECTION INTRAVENOUS at 11:57

## 2018-10-17 RX ADMIN — FENTANYL CITRATE 50 MCG: 50 INJECTION, SOLUTION INTRAMUSCULAR; INTRAVENOUS at 16:11

## 2018-10-17 RX ADMIN — PHENYLEPHRINE HYDROCHLORIDE 40 MCG: 10 INJECTION INTRAVENOUS at 13:05

## 2018-10-17 RX ADMIN — PROPOFOL 25 MCG/KG/MIN: 10 INJECTION, EMULSION INTRAVENOUS at 11:30

## 2018-10-17 RX ADMIN — PHENYLEPHRINE HYDROCHLORIDE 80 MCG: 10 INJECTION INTRAVENOUS at 11:44

## 2018-10-17 RX ADMIN — HYDROMORPHONE HYDROCHLORIDE 0.5 MG: 1 INJECTION, SOLUTION INTRAMUSCULAR; INTRAVENOUS; SUBCUTANEOUS at 15:27

## 2018-10-17 RX ADMIN — CEFAZOLIN SODIUM 2 G: 2 INJECTION, SOLUTION INTRAVENOUS at 14:19

## 2018-10-17 RX ADMIN — OXYCODONE HYDROCHLORIDE 20 MG: 20 TABLET, FILM COATED, EXTENDED RELEASE ORAL at 21:25

## 2018-10-17 RX ADMIN — HYDROMORPHONE HYDROCHLORIDE 0.5 MG: 1 INJECTION, SOLUTION INTRAMUSCULAR; INTRAVENOUS; SUBCUTANEOUS at 17:28

## 2018-10-17 RX ADMIN — IBUPROFEN 800 MG: 800 TABLET ORAL at 10:20

## 2018-10-17 RX ADMIN — EPHEDRINE SULFATE 5 MG: 50 INJECTION INTRAMUSCULAR; INTRAVENOUS; SUBCUTANEOUS at 12:00

## 2018-10-17 RX ADMIN — METHOCARBAMOL TABLETS 1000 MG: 500 TABLET, COATED ORAL at 18:36

## 2018-10-17 RX ADMIN — ONDANSETRON 4 MG: 2 INJECTION INTRAMUSCULAR; INTRAVENOUS at 14:27

## 2018-10-17 RX ADMIN — ATRACURIUM BESYLATE 20 MG: 10 INJECTION, SOLUTION INTRAVENOUS at 12:48

## 2018-10-17 RX ADMIN — CEFAZOLIN SODIUM 2 G: 10 INJECTION, POWDER, FOR SOLUTION INTRAVENOUS at 21:25

## 2018-10-17 RX ADMIN — SODIUM CHLORIDE, POTASSIUM CHLORIDE, SODIUM LACTATE AND CALCIUM CHLORIDE 9 ML/HR: 600; 310; 30; 20 INJECTION, SOLUTION INTRAVENOUS at 10:21

## 2018-10-17 RX ADMIN — HYDROMORPHONE HYDROCHLORIDE 0.5 MG: 1 INJECTION, SOLUTION INTRAMUSCULAR; INTRAVENOUS; SUBCUTANEOUS at 18:26

## 2018-10-17 RX ADMIN — LIDOCAINE HYDROCHLORIDE 0.5 ML: 10 INJECTION, SOLUTION EPIDURAL; INFILTRATION; INTRACAUDAL; PERINEURAL at 10:20

## 2018-10-17 RX ADMIN — HYDROMORPHONE HYDROCHLORIDE 0.5 MG: 1 INJECTION, SOLUTION INTRAMUSCULAR; INTRAVENOUS; SUBCUTANEOUS at 17:15

## 2018-10-17 RX ADMIN — Medication 10 MG: at 16:01

## 2018-10-17 RX ADMIN — FENTANYL CITRATE 50 MCG: 50 INJECTION, SOLUTION INTRAMUSCULAR; INTRAVENOUS at 15:33

## 2018-10-17 RX ADMIN — PROPOFOL 200 MG: 10 INJECTION, EMULSION INTRAVENOUS at 11:21

## 2018-10-17 NOTE — H&P (VIEW-ONLY)
"  NAME: EVELYNE REESE   DOS: 2018  : 1955  PCP: Kelsey Shah APRN    Chief Complaint:  No chief complaint on file.      History of Present Illness:  63 y.o. male   Follow-up back pain and left leg pain    PMHX  Allergies:  No Known Allergies  Medications    Current Outpatient Prescriptions:   •  lisinopril (PRINIVIL,ZESTRIL) 40 MG tablet, Take 40 mg by mouth Daily., Disp: , Rfl: 1  •  Morphine (MS CONTIN) 100 MG 12 hr tablet, Take 100 mg by mouth Every 12 (Twelve) Hours., Disp: , Rfl:   •  oxyCODONE (oxyCONTIN) 10 MG 12 hr tablet, Take 10 mg by mouth 3 (Three) Times a Day As Needed., Disp: , Rfl:   Past Medical History:  Past Medical History:   Diagnosis Date   • Hypertension    • Low back pain      Past Surgical History:  Past Surgical History:   Procedure Laterality Date   • LUMBAR DISC SURGERY      [facility] ()   • [place] MYELOGRAPHY VIA LUMBAR INJECT RS&I LUMBOSACRAL N/A 2018    Procedure: IR myelogram, lumbar;  Surgeon: Robert Mullins MD;  Location: [facility] INVASIVE LOCATION;  Service: Interventional Radiology   • TOTAL HIP ARTHROPLASTY Right 2018    [place], [place]     Social Hx:  Social History   Substance Use Topics   • Smoking status: Former Smoker     Types: Cigarettes     Quit date:    • Smokeless tobacco: Former User   • Alcohol use Yes      Comment: \"Moderately\"     Family Hx:  Family History   Problem Relation Age of Onset   • Heart attack Mother    • Cancer Father      Review of Systems:        Review of Systems         Physical Examination:  There were no vitals filed for this visit.   General Appearance:   Well developed, well nourished, well groomed, alert, and cooperative.  Neurological examination:  Neurologic Exam  Exam is intact he's 5 out of 5 no evidence of weakness    Review of Imaging/DATA:  Reviewed all his scans again personally he has intraforaminal disease LEFT L2-3 L3 4  Diagnoses/Plan:    Mr. Reese is a 63 y.o. male   I " discussed with the gentleman again the care he has received a second opinion given the complexities of his spine and for my standpoint I recommended an L2-3 for lumbar fusion he does have intraforaminal disease on the right at the L4 5 and L5-S1 however he is currently asymptomatic from this.  I told him that there is about a 20% chance she'll be back within 5 years for extension of his lumbar construct but the risk and benefits would dictate a shorter surgery if we stick to the 2 levels in his predominantly left sided symptoms.  We'll make arrangements for a 2 level lumbar interbody fusion L2-3 for I've explained the risks benefits and expected outcome and counseled him on his recovery

## 2018-10-17 NOTE — ANESTHESIA POSTPROCEDURE EVALUATION
Patient: Gordy Veliz    Procedure Summary     Date:  10/17/18 Room / Location:   [facility] /  [facility]    Anesthesia Start:  1114 Anesthesia Stop:  1512    Procedure:  LUMBAR LAMINECTOMY POSTERIOR LUMBAR INTERBODY FUSION L2, L3, L4 for left intraforaminal disease (N/A Spine Lumbar) Diagnosis:      Surgeon:  Victor M Han MD Provider:  Haim Crandall MD    Anesthesia Type:  general ASA Status:  2          Anesthesia Type: general  Last vitals  BP   116/74   Temp   97   Pulse   68   Resp   14     SpO2   96%     Post Anesthesia Care and Evaluation    Patient location during evaluation: PACU  Patient participation: complete - patient participated  Level of consciousness: awake and alert  Pain score: 0  Pain management: adequate  Airway patency: patent  Anesthetic complications: No anesthetic complications  PONV Status: none  Cardiovascular status: hemodynamically stable and acceptable  Respiratory status: nonlabored ventilation, acceptable and nasal cannula  Hydration status: acceptable

## 2018-10-17 NOTE — OP NOTE
Operation note Lumbar Fusion       Preoperative diagnosis  1.  Scoliosis    2.  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  Degenerative disc disease left-sided large intraforaminal disease L2-3 L3 4      Postoperative diagnosis same    Procedures performed   1.  L2 partial laminectomy L3 laminectomy bilateral foraminotomies superior margin L4  2.  Bilateral transforaminal decompression L2-3 L3 4  3.  Transforaminal lumbar interbody decompression and fusion with placement of  2 [employer] expandable 9-11 mm expandable cage packed with autograft from the laminectomized bone on the left side at the to 23 and 34 area for reduction of coronal deformity      4.  Stealth neuro navigation and O arm assisted placement of L2-L3-L4 pedicle screws 5.5, 6.5 respectively   5.  Autograft harvesting through the same incision, unilateral posterior lateral fusion L2 3 and 4    Surgeon: Victor M Han MD     Assistants: Jamir Cleaning    Anesthesia: Normal endotracheal anesthesia    ASA Class: 2  Blood loss 125 cc    Severe lateral recess compression    Complications none        Procedure in detail after formal written consent was obtained the patient was taken to the operating room endotracheally intubated given preoperative antimicrobial prophylaxis they were prepped and draped in the usual sterile manner all bony prominences and genitalia were padded to prevent neurologic injury.    At this point in time the patient was given local anesthesia at the operative level fluoroscopic guidance confirmed the correct level and a small midline incision was made paraspinal musculature was dissected off the L2 through 4 lamina which allowed access to the left transforaminal area at the to 3 and 34 areas.    Attention was first turned to the L3 4 disc space This allowed access to the transforaminal decompression area of the bilateral disc this was coagulated the disc space was entered into and  multiple curettes were used to fashion and prepped the endplate for cage placement.  Dense adherent scar tissue was noted with true intraforaminal disease at this level.    In a similar management scheme L2-3 was then prepared as well    Placement at this point time of the Medtronic 9 mm expandable cages  Were performed packed with autograft bone remainder of the autograft was impacted into the cage this was performed at both C2 3 and 34 areas    Adequate decompression of the spinal nerve roots by using a ball probe was used to pass and all the respective foramina at the to 3 and 3 for levels to demonstrate that they were clear.    Fluoroscopic imaging confirmed adequate cage placement.  At this point time on the spinous process the navigation array was affixed 3-D rotational spent with the O arm was performed and subsequent placement using high-speed bur allowed placement of the L3 4 and L2 pedicle screws and a medial lateral trajectory through the decorticated pars.  5.5 mm taps were used followed by instrumentation of the screws.  5.5 and 6.5 screws were placed at the above levels, L2, L3, L4 At this point time the posterior rods were placed and torqued to appropriate tightness after compressing the ipsilateral side of the cage    The areas were copiously irrigated, meticulous hemostasis was maintained and a small flat MIKAEL drain was placed and tunneled through the skin skin was then closed in layers.    Fluoroscopic imaging again confirmed the correct level and appropriate instrumentation placement.    Findings of the surgery were discussed with the family

## 2018-10-17 NOTE — INTERVAL H&P NOTE
Pre-Op H&P  Gordy Veliz  [id number]  1955    Chief complaint: Back pain and left leg pain    HPI:    Patient is a 63 y.o.male who presents with a history of back and left leg pain. Recent scans reviewed by Dr. Han show intraforaminal disease LEFT L2-3 L3-4. He has elected to proceed with surgery to include a lumbar laminectomy posterior lumbar interbody fusion L2, L3, L4.        Review of Systems:  General ROS: negative for chills, fever or skin lesions;  No changes since last office visit  Cardiovascular ROS: no chest pain or dyspnea on exertion  Respiratory ROS: no cough, shortness of breath, or wheezing    Allergies: No Known Allergies    Home Meds:    No current facility-administered medications on file prior to encounter.      Current Outpatient Prescriptions on File Prior to Encounter   Medication Sig Dispense Refill   • chlorhexidine (HIBICLENS) 4 % external liquid Shower each day with solution for 5 days beginning 5 days before surgery. 120 mL 0   • lisinopril (PRINIVIL,ZESTRIL) 40 MG tablet Take 40 mg by mouth Daily.  1   • Morphine (MS CONTIN) 100 MG 12 hr tablet Take 100 mg by mouth Every 12 (Twelve) Hours.     • oxyCODONE (oxyCONTIN) 10 MG 12 hr tablet Take 10 mg by mouth 3 (Three) Times a Day As Needed.         PMH:   Past Medical History:   Diagnosis Date   • Arrhythmia 1998   • COPD (chronic obstructive pulmonary disease) (CMS/HCC)     very mild due to history of smoking    • Hypertension    • Low back pain    • Rupture of eye     left-macular-no treatment or surgery    • Wears glasses      PSH:    Past Surgical History:   Procedure Laterality Date   • COLONOSCOPY  2003   • LUMBAR DISC SURGERY  1999    [facility] ()   • [place] MYELOGRAPHY VIA LUMBAR INJECT RS&I LUMBOSACRAL N/A 7/20/2018    Procedure: IR myelogram, lumbar;  Surgeon: Robert Mullins MD;  Location: [facility] INVASIVE LOCATION;  Service: Interventional Radiology   • SKIN BIOPSY Right     rt arm-benign   •  "TONSILLECTOMY     • TOTAL HIP ARTHROPLASTY Right 02/14/2018    [place], [place]         Social History:   Tobacco:   History   Smoking Status   • Former Smoker   • Packs/day: 1.00   • Years: 45.00   • Types: Cigarettes   • Quit date: 2017   Smokeless Tobacco   • Never Used      Alcohol:     History   Alcohol Use No     Comment: \"Moderately\" socially        Vitals:      HR 84, /98, RR 16, O2 99%, afebrile    Physical Exam:  General Appearance:    Alert, cooperative, no distress, appears stated age   Head:    Normocephalic, without obvious abnormality, atraumatic   Lungs:     Clear to auscultation bilaterally, respirations unlabored    Heart:   Regular rate and rhythm, S1 and S2 normal, no murmur, rub    or gallop    Abdomen:    Soft, non-tender, +bowel sounds   Breast Exam:    deferred   Genitalia:    deferred   Extremities:   Extremities normal, atraumatic, no cyanosis or edema   Skin:   Skin color, texture, turgor normal, no rashes or lesions   Neurologic:   Grossly intact   Results Review  I reviewed the patient's new clinical results.    Cancer Staging (if applicable)  Cancer Patient: __ yes _X_no __unknown; If yes, clinical stage T:__ N:__M:__, stage group or __N/A    Impression: Intraforaminal disease left L2-L3, L3-L4    Plan: Lumbar laminectomy posterior lumbar interbody fusion L2, L3, L4        Catie Keen, APRJING   10/17/2018   10:09 AM  "

## 2018-10-17 NOTE — ANESTHESIA PROCEDURE NOTES
Airway  Urgency: elective    Airway not difficult    General Information and Staff    Patient location during procedure: OR  CRNA: GAL MOSQUEDA    Indications and Patient Condition  Indications for airway management: airway protection    Preoxygenated: yes  MILS not maintained throughout  Mask difficulty assessment: 1 - vent by mask    Final Airway Details  Final airway type: endotracheal airway      Successful airway: ETT  Cuffed: yes   Successful intubation technique: direct laryngoscopy  Endotracheal tube insertion site: oral  Blade: Paige  Blade size: 2  ETT size: 7.5 mm  Cormack-Lehane Classification: grade I - full view of glottis  Placement verified by: chest auscultation and capnometry   Measured from: lips  ETT to lips (cm): 23  Number of attempts at approach: 1    Additional Comments  Negative epigastric sounds, Breath sound equal bilaterally with symmetric chest rise and fall

## 2018-10-17 NOTE — ANESTHESIA PREPROCEDURE EVALUATION
Anesthesia Evaluation     Patient summary reviewed and Nursing notes reviewed   NPO Solid Status: > 8 hours  NPO Liquid Status: > 8 hours           Airway   Mallampati: II  TM distance: >3 FB  Neck ROM: full  No difficulty expected  Dental      Pulmonary    (+) a smoker (2017) Former, COPD,   Cardiovascular     ECG reviewed    (+) hypertension,     ROS comment: Normal sinus rhythm with sinus arrhythmia  Normal ECG    Neuro/Psych  (+) numbness (L radiculopathy ),       ROS Comment: Spinal stenosis /DDD multilevel /scoliosis  GI/Hepatic/Renal/Endo      Musculoskeletal     Abdominal    Substance History      OB/GYN          Other   (+) arthritis     ROS/Med Hx Other: Oxycodone  MS contin                Anesthesia Plan    ASA 2     general     intravenous induction   Anesthetic plan, all risks, benefits, and alternatives have been provided, discussed and informed consent has been obtained with: patient.    Plan discussed with CRNA.

## 2018-10-18 PROCEDURE — 25010000002 HYDROMORPHONE 1 MG/ML SOLUTION: Performed by: NEUROLOGICAL SURGERY

## 2018-10-18 PROCEDURE — 97110 THERAPEUTIC EXERCISES: CPT

## 2018-10-18 PROCEDURE — 97116 GAIT TRAINING THERAPY: CPT

## 2018-10-18 PROCEDURE — 25010000002 CEFAZOLIN PER 500 MG: Performed by: NEUROLOGICAL SURGERY

## 2018-10-18 PROCEDURE — 97161 PT EVAL LOW COMPLEX 20 MIN: CPT

## 2018-10-18 RX ADMIN — OXYCODONE HYDROCHLORIDE 20 MG: 20 TABLET, FILM COATED, EXTENDED RELEASE ORAL at 14:30

## 2018-10-18 RX ADMIN — METHOCARBAMOL TABLETS 1000 MG: 500 TABLET, COATED ORAL at 23:52

## 2018-10-18 RX ADMIN — HYDROMORPHONE HYDROCHLORIDE 0.5 MG: 1 INJECTION, SOLUTION INTRAMUSCULAR; INTRAVENOUS; SUBCUTANEOUS at 00:06

## 2018-10-18 RX ADMIN — OXYCODONE HYDROCHLORIDE AND ACETAMINOPHEN 2 TABLET: 7.5; 325 TABLET ORAL at 04:09

## 2018-10-18 RX ADMIN — MORPHINE SULFATE 100 MG: 100 TABLET, EXTENDED RELEASE ORAL at 05:20

## 2018-10-18 RX ADMIN — OXYCODONE HYDROCHLORIDE 20 MG: 20 TABLET, FILM COATED, EXTENDED RELEASE ORAL at 05:20

## 2018-10-18 RX ADMIN — CEFAZOLIN SODIUM 2 G: 10 INJECTION, POWDER, FOR SOLUTION INTRAVENOUS at 05:20

## 2018-10-18 RX ADMIN — METHOCARBAMOL TABLETS 1000 MG: 500 TABLET, COATED ORAL at 04:09

## 2018-10-18 RX ADMIN — HYDROMORPHONE HYDROCHLORIDE 0.5 MG: 1 INJECTION, SOLUTION INTRAMUSCULAR; INTRAVENOUS; SUBCUTANEOUS at 04:10

## 2018-10-18 RX ADMIN — OXYCODONE HYDROCHLORIDE AND ACETAMINOPHEN 2 TABLET: 7.5; 325 TABLET ORAL at 15:29

## 2018-10-18 RX ADMIN — LISINOPRIL 40 MG: 20 TABLET ORAL at 08:06

## 2018-10-18 RX ADMIN — ALPRAZOLAM 0.25 MG: 0.25 TABLET ORAL at 08:07

## 2018-10-18 RX ADMIN — TEMAZEPAM 15 MG: 15 CAPSULE ORAL at 23:52

## 2018-10-18 RX ADMIN — OXYCODONE HYDROCHLORIDE 20 MG: 20 TABLET, FILM COATED, EXTENDED RELEASE ORAL at 20:03

## 2018-10-18 RX ADMIN — TEMAZEPAM 15 MG: 15 CAPSULE ORAL at 00:05

## 2018-10-18 RX ADMIN — METHOCARBAMOL TABLETS 1000 MG: 500 TABLET, COATED ORAL at 17:27

## 2018-10-18 RX ADMIN — OXYCODONE HYDROCHLORIDE AND ACETAMINOPHEN 2 TABLET: 7.5; 325 TABLET ORAL at 00:05

## 2018-10-18 RX ADMIN — ALPRAZOLAM 0.25 MG: 0.25 TABLET ORAL at 15:29

## 2018-10-18 RX ADMIN — MORPHINE SULFATE 100 MG: 100 TABLET, EXTENDED RELEASE ORAL at 17:26

## 2018-10-18 RX ADMIN — OXYCODONE HYDROCHLORIDE AND ACETAMINOPHEN 2 TABLET: 7.5; 325 TABLET ORAL at 20:03

## 2018-10-18 RX ADMIN — OXYCODONE HYDROCHLORIDE AND ACETAMINOPHEN 2 TABLET: 7.5; 325 TABLET ORAL at 23:45

## 2018-10-18 RX ADMIN — OXYCODONE HYDROCHLORIDE AND ACETAMINOPHEN 2 TABLET: 7.5; 325 TABLET ORAL at 08:05

## 2018-10-18 RX ADMIN — OXYCODONE HYDROCHLORIDE AND ACETAMINOPHEN 2 TABLET: 7.5; 325 TABLET ORAL at 11:57

## 2018-10-18 RX ADMIN — METHOCARBAMOL TABLETS 1000 MG: 500 TABLET, COATED ORAL at 11:57

## 2018-10-18 RX ADMIN — Medication 3 ML: at 08:07

## 2018-10-18 NOTE — PLAN OF CARE
Problem: Patient Care Overview  Goal: Plan of Care Review  Outcome: Ongoing (interventions implemented as appropriate)   10/18/18 0617   Coping/Psychosocial   Plan of Care Reviewed With patient   Plan of Care Review   Progress no change   OTHER   Outcome Summary pts pain uncontrolable and wasn't managed with the current regimin of pain medication, VSS, pt voided well, will continue to monitor and attempt to manage pain.        Problem: Laminectomy/Foraminotomy/Discectomy (Adult)  Goal: Signs and Symptoms of Listed Potential Problems Will be Absent, Minimized or Managed (Laminectomy/Foraminotomy/Discectomy)  Outcome: Ongoing (interventions implemented as appropriate)

## 2018-10-18 NOTE — PROGRESS NOTES
Discharge Planning Assessment  [facility]     Patient Name: Gordy Veliz  MRN: [id number]  Today's Date: 10/18/2018    Admit Date: 10/17/2018          Discharge Needs Assessment     Row Name 10/18/18 0919       Living Environment    Lives With alone    Current Living Arrangements home/apartment/condo    Primary Care Provided by self    Provides Primary Care For no one    Family Caregiver if Needed none    Quality of Family Relationships unable to assess    Able to Return to Prior Arrangements yes       Resource/Environmental Concerns    Resource/Environmental Concerns none       Transition Planning    Patient/Family Anticipates Transition to home    Patient/Family Anticipated Services at Transition ;rehabilitation services    Transportation Anticipated family or friend will provide       Discharge Needs Assessment    Readmission Within the Last 30 Days no previous admission in last 30 days    Concerns to be Addressed discharge planning    Equipment Currently Used at Home cane, quad;walker, rolling;shower chair    Anticipated Changes Related to Illness none    Equipment Needed After Discharge none            Discharge Plan     Row Name 10/18/18 0920       Plan    Plan Home    Patient/Family in Agreement with Plan yes    Plan Comments Spoke with patient in room to initiate discharge planning.  He lives alone in [facility].  Prior to admission, he was independent with ADL's.  He has used [employer] in the past.  He has a quad cane, rolling walker and a shower chair at home.  Mr. Veliz has RX coverage through [employer] and has his scriptsfilled at [facility].  His goal is to return home at discharge.  Will await therapy recommendations to determine proper discharge placement.  CM will continue to follow.  Herlinda Gracia RN [phone]    Final Discharge Disposition Code 01 - home or self-care        Destination     No service coordination in this encounter.       Durable Medical Equipment     No service coordination in this encounter.      Dialysis/Infusion     No service coordination in this encounter.      Home Medical Care     No service coordination in this encounter.      Social Care     No service coordination in this encounter.                Demographic Summary     Row Name 10/18/18 0918       General Information    Admission Type inpatient    Arrived From PACU/recovery room    Referral Source admission list    Reason for Consult discharge planning    Preferred Language English     Used During This Interaction no    General Information Comments PCP- Kelsey Shah            Functional Status     Row Name 10/18/18 0918       Functional Status    Usual Activity Tolerance good    Current Activity Tolerance good       Functional Status, IADL    Medications independent    Meal Preparation independent    Housekeeping independent    Laundry independent    Shopping independent            Psychosocial    No documentation.           Abuse/Neglect    No documentation.           Legal     Row Name 10/18/18 0919       Financial/Legal    Finance Comments Verified with patient that he has Medicare.  No issues obtaining medications.            Substance Abuse    No documentation.           Patient Forms    No documentation.         Herlinda Gracia RN

## 2018-10-18 NOTE — DISCHARGE INSTR - ACTIVITY
Back precautions: no bending, lifting, twisting.   No driving until cleared by MD.   Do not submerge wound in water (tub, hot tub, swimming pool)

## 2018-10-18 NOTE — THERAPY EVALUATION
Acute Care - Physical Therapy Initial Evaluation   [facility]     Patient Name: Gordy Veliz  : 1955  MRN: [id number]  Today's Date: 10/18/2018   Onset of Illness/Injury or Date of Surgery: 10/17/18  Date of Referral to PT: 10/18/18  Referring Physician: MD Guille      Admit Date: 10/17/2018    Visit Dx:     ICD-10-CM ICD-9-CM   1. Impaired functional mobility, balance, gait, and endurance Z74.09 V49.89     Patient Active Problem List   Diagnosis   • Multilevel degenerative disc disease   • Bulging lumbar disc   • Anxiety   • Spinal stenosis of lumbar region without neurogenic claudication   • Lumbar radiculopathy   • Scoliosis (and kyphoscoliosis), idiopathic   • Spondylolisthesis of lumbar region   • Acquired spondylolysis     Past Medical History:   Diagnosis Date   • Arrhythmia    • COPD (chronic obstructive pulmonary disease) (CMS/HCC)     very mild due to history of smoking    • Hypertension    • Low back pain    • Rupture of eye     left-macular-no treatment or surgery    • Wears glasses      Past Surgical History:   Procedure Laterality Date   • COLONOSCOPY     • LUMBAR DISC SURGERY      [facility] ()   • PA MYELOGRAPHY VIA LUMBAR INJECT RS&I LUMBOSACRAL N/A 2018    Procedure: IR myelogram, lumbar;  Surgeon: Robert Mullins MD;  Location: [facility] INVASIVE LOCATION;  Service: Interventional Radiology   • SKIN BIOPSY Right     rt arm-benign   • TONSILLECTOMY     • TOTAL HIP ARTHROPLASTY Right 2018    [place], [place]        PT ASSESSMENT (last 12 hours)      Physical Therapy Evaluation     Row Name 10/18/18 0920          PT Evaluation Time/Intention    Subjective Information complains of;pain  -MJ     Document Type evaluation  -MJ     Mode of Treatment physical therapy  -MJ     Patient Effort excellent  -MJ     Symptoms Noted During/After Treatment increased pain  -MJ     Comment RN notified  -MJ     Row Name 10/18/18 0920          General Information     Patient Profile Reviewed? yes  -MJ     Onset of Illness/Injury or Date of Surgery 10/17/18  -MJ     Referring Physician MD Guille  -MJ     Patient Observations alert;cooperative;agree to therapy  -MJ     General Observations of Patient Pt supine in bed, MIKAEL drain, IV heplocked.   -MJ     Prior Level of Function min assist:;all household mobility;community mobility;gait;transfer;bed mobility;cooking;cleaning;driving;shopping   Pain increased with activity, limited walking  -MJ     Equipment Currently Used at Home walker, rolling;cane, straight;raised toilet;hospital bed;shower chair;grab bar   owns, was not using  -MJ     Pertinent History of Current Functional Problem Pt presents for surgical management of back pain and dysfunction with symptoms into L LE that failed to improve with conservative management  -MJ     Existing Precautions/Restrictions fall;spinal;other (see comments)   MIKAEL drain  -MJ     Risks Reviewed patient:;LOB;increased discomfort  -MJ     Benefits Reviewed patient:;improve function;increase independence;increase strength;increase balance;decrease pain  -MJ     Barriers to Rehab none identified  -MJ     Row Name 10/18/18 0920          Relationship/Environment    Lives With alone  -MJ     Family Caregiver if Needed none  -MJ     Concerns About Impact on Relationships Pt reports has people to come check on him the first couple days  -MJ     Row Name 10/18/18 0920          Resource/Environmental Concerns    Current Living Arrangements home/apartment/condo  -MJ     Row Name 10/18/18 0920          Home Main Entrance    Number of Stairs, Main Entrance four  -MJ     Stair Railings, Main Entrance none  -MJ     Row Name 10/18/18 0920          Cognitive Assessment/Intervention- PT/OT    Orientation Status (Cognition) oriented x 4  -MJ     Follows Commands (Cognition) WFL  -MJ     Row Name 10/18/18 0920          Safety Issues, Functional Mobility    Impairments Affecting Function (Mobility) pain;strength   -     Row Name 10/18/18 0920          Bed Mobility Assessment/Treatment    Bed Mobility Assessment/Treatment supine-sit;sit-supine  -MJ     Supine-Sit [place] (Bed Mobility) contact guard;verbal cues  -MJ     Sit-Supine [place] (Bed Mobility) contact guard;verbal cues  -MJ     Assistive Device (Bed Mobility) bed rails  -     Comment (Bed Mobility) Pt performed log roll into/out of bed, verbal cues for sequencing. CGA with trunk for supine to/from sit  -     Row Name 10/18/18 0920          Transfer Assessment/Treatment    Transfer Assessment/Treatment sit-stand transfer;stand-sit transfer  -     Comment (Transfers) Verbal cues to push up from bed with UEs to stand and to reach back for bed to lower into sitting.   -MJ     Sit-Stand [place] (Transfers) contact guard;verbal cues  -MJ     Stand-Sit [place] (Transfers) contact guard;verbal cues  -     Row Name 10/18/18 0920          Sit-Stand Transfer    Assistive Device (Sit-Stand Transfers) walker, front-wheeled  -     Row Name 10/18/18 0920          Stand-Sit Transfer    Assistive Device (Stand-Sit Transfers) walker, front-wheeled  -     Row Name 10/18/18 0920          Gait/Stairs Assessment/Training    02667 - Gait Training Minutes  16  -MJ     [place] Level (Gait) contact guard;verbal cues  -     Assistive Device (Gait) walker, front-wheeled  -     Distance in Feet (Gait) 290  -MJ     Pattern (Gait) step-through  -     Deviations/Abnormal Patterns (Gait) bilateral deviations;annie decreased  -     Comment (Gait/Stairs) Pt demo step through gait pattern at slow pace. Verbal cues for upright posture and to increase LE weight bearing, improvement noted. Cues to  feet during turns as not to twist. Gait limited by fatigue  -     Row Name 10/18/18 0920          General ROM    GENERAL ROM COMMENTS No ROM deficits B LE  -     Row Name 10/18/18 0920          MMT (Manual Muscle Testing)    General MMT Comments B LE  grossly 4/5  -MJ     Row Name 10/18/18 0920          Motor Assessment/Intervention    Additional Documentation Therapeutic Exercise (Group);Therapeutic Exercise Interventions (Group)  -MJ     Row Name 10/18/18 0920          Therapeutic Exercise    57007 - PT Therapeutic Exercise Minutes 5  -MJ     60192 - PT Therapeutic Activity Minutes 3  -MJ     Row Name 10/18/18 0920          Therapeutic Exercise    Lower Extremity (Therapeutic Exercise) hamstring sets, left;quad sets, bilateral  -MJ     Lower Extremity Range of Motion (Therapeutic Exercise) hip internal/external rotation, bilateral;ankle dorsiflexion/plantar flexion, bilateral  -MJ     Core Strength (Therapeutic Exercise) abdominal bracing  -MJ     Exercise Type (Therapeutic Exercise) AROM (active range of motion);isometric contraction, static  -MJ     Position (Therapeutic Exercise) supine  -MJ     Sets/Reps (Therapeutic Exercise) 10x each  -MJ     Comment (Therapeutic Exercise) Cues for technique  -MJ     Row Name 10/18/18 0920          Sensory Assessment/Intervention    Sensory General Assessment no sensation deficits identified   denies numbness/tingling; can actively DF both ankles  -MJ     Row Name 10/18/18 0920          Pain Assessment    Additional Documentation Pain Scale: Numbers Pre/Post-Treatment (Group)  -MJ     Row Name 10/18/18 0920          Pain Scale: Numbers Pre/Post-Treatment    Pain Scale: Numbers, Pretreatment 7/10  -MJ     Pain Scale: Numbers, Post-Treatment 8/10  -MJ     Pain Location - Orientation lower  -MJ     Pain Location back  -MJ     Pain Intervention(s) Repositioned;Ambulation/increased activity  -MJ     Row Name             Wound 10/17/18 1215 Other (See comments) back incision    Wound - Properties Group Date first assessed: 10/17/18  -WS Time first assessed: 1215  -WS Side: Other (See comments)  -WS Location: back  -WS Type: incision  -WS    Row Name 10/18/18 0920          Plan of Care Review    Plan of Care Reviewed With  patient  -     Row Name 10/18/18 0920          Physical Therapy Clinical Impression    Date of Referral to PT 10/18/18  -     PT Diagnosis (PT Clinical Impression) s/p L2, L3 laminectomy; L2-3, L3-4 decompression with fusion  -     Criteria for Skilled Interventions Met (PT Clinical Impression) yes;treatment indicated  -     Care Plan Review (PT) evaluation/treatment results reviewed;care plan/treatment goals reviewed;risks/benefits reviewed;patient/other agree to care plan  -     Referral Needed to Another Service (PT) occupational therapy  -     Row Name 10/18/18 0920          Physical Therapy Goals    Bed Mobility Goal Selection (PT) bed mobility, PT goal 1  -MJ     Transfer Goal Selection (PT) transfer, PT goal 1  -MJ     Gait Training Goal Selection (PT) gait training, PT goal 1  -MJ     Stairs Goal Selection (PT) stairs, PT goal 1  -MJ     Additional Documentation Stairs Goal Selection (PT) (Row)  -     Row Name 10/18/18 0920          Bed Mobility Goal 1 (PT)    Activity/Assistive Device (Bed Mobility Goal 1, PT) sit to supine/supine to sit   via log roll  -MJ     [place] Level/Cues Needed (Bed Mobility Goal 1, PT) conditional independence  -MJ     Time Frame (Bed Mobility Goal 1, PT) 5 days;long term goal (LTG)  -MJ     Progress/Outcomes (Bed Mobility Goal 1, PT) goal ongoing  -     Row Name 10/18/18 0920          Transfer Goal 1 (PT)    Activity/Assistive Device (Transfer Goal 1, PT) sit-to-stand/stand-to-sit;walker, rolling  -MJ     [place] Level/Cues Needed (Transfer Goal 1, PT) conditional independence  -MJ     Time Frame (Transfer Goal 1, PT) 5 days;long term goal (LTG)  -MJ     Progress/Outcome (Transfer Goal 1, PT) goal ongoing  -     Row Name 10/18/18 0920          Gait Training Goal 1 (PT)    Activity/Assistive Device (Gait Training Goal 1, PT) gait (walking locomotion);walker, rolling  -MJ     [place] Level (Gait Training Goal 1, PT) conditional independence  -MJ      Distance (Gait Goal 1, PT) 500 feet  -MJ     Time Frame (Gait Training Goal 1, PT) 5 days;long term goal (LTG)  -MJ     Progress/Outcome (Gait Training Goal 1, PT) goal ongoing  -     Row Name 10/18/18 0920          Stairs Goal 1 (PT)    Activity/Assistive Device (Stairs Goal 1, PT) ascending stairs;descending stairs;cane, straight  -MJ     [place] Level/Cues Needed (Stairs Goal 1, PT) contact guard assist  -MJ     Number of Stairs (Stairs Goal 1, PT) 4  -MJ     Time Frame (Stairs Goal 1, PT) 5 days;long term goal (LTG)  -MJ     Progress/Outcome (Stairs Goal 1, PT) goal ongoing  -     Row Name 10/18/18 0920          Positioning and Restraints    Pre-Treatment Position in bed  -MJ     Post Treatment Position bed  -MJ     In Bed notified nsg;supine;call light within reach;encouraged to call for assist;pillow between legs  -MJ     Row Name 10/18/18 0920          Living Environment    Home Accessibility stairs to enter home;tub/shower is not walk in  -MJ       User Key  (r) = Recorded By, (t) = Taken By, (c) = Cosigned By    Initials Name Provider Type     Kai Burks, PT Physical Therapist    Jacquelyn Urban, RN Registered Nurse          Physical Therapy Education     Title: PT OT SLP Therapies (Done)     Topic: Physical Therapy (Done)     Point: Mobility training (Done)    Learning Progress Summary     Learner Status Readiness Method Response Comment Documented by    Patient Done Acceptance E,D,H DAMON,NR Edu re: back precautions, log roll, HEP, gait mechanics, benefits of mobility. Issued HEP handout  10/18/18 0920          Point: Home exercise program (Done)    Learning Progress Summary     Learner Status Readiness Method Response Comment Documented by    Patient Done Acceptance E,D,H VU,NR Edu re: back precautions, log roll, HEP, gait mechanics, benefits of mobility. Issued HEP handout  10/18/18 0920          Point: Body mechanics (Done)    Learning Progress Summary     Learner Status Readiness  Method Response Comment Documented by    Patient Done Acceptance E,IZABELA,H DAMON,NR Edu re: back precautions, log roll, HEP, gait mechanics, benefits of mobility. Issued HEP handout  10/18/18 0920          Point: Precautions (Done)    Learning Progress Summary     Learner Status Readiness Method Response Comment Documented by    Patient Done Acceptance E,D,H DAMON,NR Edu re: back precautions, log roll, HEP, gait mechanics, benefits of mobility. Issued HEP handout  10/18/18 0920                      User Key     Initials Effective Dates Name Provider Type Discipline     04/03/18 -  Kai Burks, PT Physical Therapist PT                PT Recommendation and Plan  Anticipated Discharge Disposition (PT): home with assist  Planned Therapy Interventions (PT Eval): balance training, bed mobility training, gait training, home exercise program, patient/family education, stair training, strengthening, transfer training  Therapy Frequency (PT Clinical Impression): daily  Outcome Summary/Treatment Plan (PT)  Anticipated Discharge Disposition (PT): home with assist  Referral Needed to Another Service (PT): occupational therapy  Plan of Care Reviewed With: patient  Outcome Summary: Pt ambulated 290 feet with CGA and RW. PT will follow to increase strength and progress functional mobility with back precautions. Pt would benefit from OT services. Plan is home at discharge          Outcome Measures     Row Name 10/18/18 0920             How much help from another person do you currently need...    Turning from your back to your side while in flat bed without using bedrails? 3  -MJ      Moving from lying on back to sitting on the side of a flat bed without bedrails? 3  -MJ      Moving to and from a bed to a chair (including a wheelchair)? 3  -MJ      Standing up from a chair using your arms (e.g., wheelchair, bedside chair)? 3  -MJ      Climbing 3-5 steps with a railing? 3  -MJ      To walk in hospital room? 3  -MJ      AM-PAC 6 Clicks  Score 18  -         Functional Assessment    Outcome Measure Options AM-PAC 6 Clicks Basic Mobility (PT)  -MJ        User Key  (r) = Recorded By, (t) = Taken By, (c) = Cosigned By    Initials Name Provider Type    Kai Sosa PT Physical Therapist           Time Calculation:         PT Charges     Row Name 10/18/18 0920             Time Calculation    Start Time 0920  -MJ      PT Received On 10/18/18  -      PT Goal Re-Cert Due Date 10/28/18  -         Time Calculation- PT    Total Timed Code Minutes- PT 24 minute(s)  -MJ         Timed Charges    26456 - PT Therapeutic Exercise Minutes 5  -MJ      87335 - Gait Training Minutes  16  -MJ      21674 - PT Therapeutic Activity Minutes 3  -MJ        User Key  (r) = Recorded By, (t) = Taken By, (c) = Cosigned By    Initials Name Provider Type    Kai Sosa PT Physical Therapist        Therapy Suggested Charges     Code   Minutes Charges    57670 (CPT®) Hc Pt Neuromusc Re Education Ea 15 Min      75775 (CPT®) Hc Pt Ther Proc Ea 15 Min 5 1    65889 (CPT®) Hc Gait Training Ea 15 Min 16 1    58925 (CPT®) Hc Pt Therapeutic Act Ea 15 Min 3     80121 (CPT®) Hc Pt Manual Therapy Ea 15 Min      61651 (CPT®) Hc Pt Iontophoresis Ea 15 Min      58210 (CPT®) Hc Pt Elec Stim Ea-Per 15 Min      43061 (CPT®) Hc Pt Ultrasound Ea 15 Min      63350 (CPT®) Hc Pt Self Care/Mgmt/Train Ea 15 Min      12381 (CPT®) Hc Pt Prosthetic (S) Train Initial Encounter, Each 15 Min      60468 (CPT®) Hc Pt Orthotic(S)/Prosthetic(S) Encounter, Each 15 Min      58717 (CPT®) Hc Orthotic(S) Mgmt/Train Initial Encounter, Each 15min      Total  24 2        Therapy Charges for Today     Code Description Service Date Service Provider Modifiers Qty    55532709654 HC PT EVAL LOW COMPLEXITY 3 10/18/2018 Kai Burks, PT GP 1    64813942480 HC GAIT TRAINING EA 15 MIN 10/18/2018 Kai Burks, PT GP 1    02448249877 HC PT THER PROC EA 15 MIN 10/18/2018 Kai Burks, PT GP 1          PT G-Codes  Outcome  Measure Options: AM-PAC 6 Clicks Basic Mobility (PT)  -PAC 6 Clicks Score: 18      Kai Burks, PT  10/18/2018

## 2018-10-18 NOTE — PLAN OF CARE
Problem: Patient Care Overview  Goal: Plan of Care Review   10/18/18 1615   Coping/Psychosocial   Plan of Care Reviewed With patient   Plan of Care Review   Progress no change   OTHER   Outcome Summary 62 yo male admitted 10/17/18 for lami. Pain control has been an issue, has been on long term extended release medications complicating acute pain control. Pt also w/ anxiety on prn xanax. Working w/ therapy, able to walk w/ gait belt and walker and standby assist-needs encouragement to be mobile. MIKAEL w/ decreased output, verbal order from Dr. Cleaning to remove MIKAEL, pt declined having it removed this afternoon d/t increased pain. Will attempt again this evening. VSS, voiding well, tolerating diet, and LBM 10/17/18.  Anticipate home w/ family when medically ready.

## 2018-10-18 NOTE — PLAN OF CARE
Problem: Patient Care Overview  Goal: Plan of Care Review  Outcome: Ongoing (interventions implemented as appropriate)   10/18/18 0920   Coping/Psychosocial   Plan of Care Reviewed With patient   OTHER   Outcome Summary Pt ambulated 290 feet with CGA and RW. PT will follow to increase strength and progress functional mobility with back precautions. Pt would benefit from OT services. Plan is home at discharge       Problem: Laminectomy/Foraminotomy/Discectomy (Adult)  Goal: Signs and Symptoms of Listed Potential Problems Will be Absent, Minimized or Managed (Laminectomy/Foraminotomy/Discectomy)  Outcome: Ongoing (interventions implemented as appropriate)   10/18/18 0920   Goal/Outcome Evaluation   Problems Assessed (Laminectomy/Laminotomy/Discectomy) functional decline/self-care deficit;pain   Problems Present (Laminectomy/otomy) functional decline/self-care deficit;pain

## 2018-10-19 PROCEDURE — 25010000002 HYDROMORPHONE 1 MG/ML SOLUTION: Performed by: NEUROLOGICAL SURGERY

## 2018-10-19 PROCEDURE — 97110 THERAPEUTIC EXERCISES: CPT

## 2018-10-19 PROCEDURE — 97165 OT EVAL LOW COMPLEX 30 MIN: CPT

## 2018-10-19 PROCEDURE — 97116 GAIT TRAINING THERAPY: CPT

## 2018-10-19 PROCEDURE — 97530 THERAPEUTIC ACTIVITIES: CPT

## 2018-10-19 PROCEDURE — 97535 SELF CARE MNGMENT TRAINING: CPT

## 2018-10-19 PROCEDURE — 99024 POSTOP FOLLOW-UP VISIT: CPT | Performed by: NEUROLOGICAL SURGERY

## 2018-10-19 PROCEDURE — 25010000003 CEFAZOLIN IN DEXTROSE 2-4 GM/100ML-% SOLUTION: Performed by: NEUROLOGICAL SURGERY

## 2018-10-19 RX ORDER — OXYCODONE AND ACETAMINOPHEN 10; 325 MG/1; MG/1
1 TABLET ORAL
Status: DISCONTINUED | OUTPATIENT
Start: 2018-10-19 | End: 2018-10-20 | Stop reason: HOSPADM

## 2018-10-19 RX ORDER — CEFAZOLIN SODIUM 2 G/100ML
2 INJECTION, SOLUTION INTRAVENOUS EVERY 8 HOURS
Status: COMPLETED | OUTPATIENT
Start: 2018-10-19 | End: 2018-10-20

## 2018-10-19 RX ORDER — IBUPROFEN 800 MG/1
800 TABLET ORAL EVERY 8 HOURS SCHEDULED
Status: COMPLETED | OUTPATIENT
Start: 2018-10-19 | End: 2018-10-20

## 2018-10-19 RX ORDER — DIAZEPAM 5 MG/1
5 TABLET ORAL EVERY 8 HOURS PRN
Status: DISCONTINUED | OUTPATIENT
Start: 2018-10-19 | End: 2018-10-20 | Stop reason: HOSPADM

## 2018-10-19 RX ADMIN — LISINOPRIL 40 MG: 20 TABLET ORAL at 08:21

## 2018-10-19 RX ADMIN — OXYCODONE HYDROCHLORIDE 20 MG: 20 TABLET, FILM COATED, EXTENDED RELEASE ORAL at 13:35

## 2018-10-19 RX ADMIN — OXYCODONE HYDROCHLORIDE AND ACETAMINOPHEN 1 TABLET: 10; 325 TABLET ORAL at 14:47

## 2018-10-19 RX ADMIN — HYDROMORPHONE HYDROCHLORIDE 0.5 MG: 1 INJECTION, SOLUTION INTRAMUSCULAR; INTRAVENOUS; SUBCUTANEOUS at 04:14

## 2018-10-19 RX ADMIN — DIAZEPAM 5 MG: 5 TABLET ORAL at 10:25

## 2018-10-19 RX ADMIN — POLYETHYLENE GLYCOL 3350 17 G: 17 POWDER, FOR SOLUTION ORAL at 19:36

## 2018-10-19 RX ADMIN — OXYCODONE HYDROCHLORIDE 20 MG: 20 TABLET, FILM COATED, EXTENDED RELEASE ORAL at 05:34

## 2018-10-19 RX ADMIN — DIAZEPAM 5 MG: 5 TABLET ORAL at 17:41

## 2018-10-19 RX ADMIN — MORPHINE SULFATE 100 MG: 100 TABLET, EXTENDED RELEASE ORAL at 05:33

## 2018-10-19 RX ADMIN — MORPHINE SULFATE 100 MG: 100 TABLET, EXTENDED RELEASE ORAL at 17:41

## 2018-10-19 RX ADMIN — IBUPROFEN 800 MG: 800 TABLET, FILM COATED ORAL at 17:00

## 2018-10-19 RX ADMIN — OXYCODONE HYDROCHLORIDE AND ACETAMINOPHEN 1 TABLET: 10; 325 TABLET ORAL at 16:59

## 2018-10-19 RX ADMIN — METHOCARBAMOL TABLETS 1000 MG: 500 TABLET, COATED ORAL at 14:47

## 2018-10-19 RX ADMIN — OXYCODONE HYDROCHLORIDE AND ACETAMINOPHEN 1 TABLET: 10; 325 TABLET ORAL at 19:35

## 2018-10-19 RX ADMIN — OXYCODONE HYDROCHLORIDE AND ACETAMINOPHEN 1 TABLET: 10; 325 TABLET ORAL at 12:11

## 2018-10-19 RX ADMIN — OXYCODONE HYDROCHLORIDE AND ACETAMINOPHEN 1 TABLET: 10; 325 TABLET ORAL at 08:21

## 2018-10-19 RX ADMIN — OXYCODONE HYDROCHLORIDE 20 MG: 20 TABLET, FILM COATED, EXTENDED RELEASE ORAL at 21:33

## 2018-10-19 RX ADMIN — OXYCODONE HYDROCHLORIDE AND ACETAMINOPHEN 1 TABLET: 10; 325 TABLET ORAL at 10:25

## 2018-10-19 RX ADMIN — Medication 3 ML: at 19:36

## 2018-10-19 RX ADMIN — CEFAZOLIN SODIUM 2 G: 2 INJECTION, SOLUTION INTRAVENOUS at 19:35

## 2018-10-19 RX ADMIN — IBUPROFEN 800 MG: 800 TABLET, FILM COATED ORAL at 21:34

## 2018-10-19 RX ADMIN — METHOCARBAMOL TABLETS 1000 MG: 500 TABLET, COATED ORAL at 08:21

## 2018-10-19 RX ADMIN — CEFAZOLIN SODIUM 2 G: 2 INJECTION, SOLUTION INTRAVENOUS at 12:02

## 2018-10-19 RX ADMIN — OXYCODONE HYDROCHLORIDE AND ACETAMINOPHEN 2 TABLET: 7.5; 325 TABLET ORAL at 04:32

## 2018-10-19 NOTE — PLAN OF CARE
Problem: Patient Care Overview  Goal: Plan of Care Review  Outcome: Ongoing (interventions implemented as appropriate)      Problem: Laminectomy/Foraminotomy/Discectomy (Adult)  Goal: Signs and Symptoms of Listed Potential Problems Will be Absent, Minimized or Managed (Laminectomy/Foraminotomy/Discectomy)  Outcome: Ongoing (interventions implemented as appropriate)   10/19/18 1037   Goal/Outcome Evaluation   Problems Assessed (Laminectomy/Laminotomy/Discectomy) functional decline/self-care deficit;pain   Problems Present (Laminectomy/otomy) functional decline/self-care deficit;pain

## 2018-10-19 NOTE — PLAN OF CARE
Problem: Patient Care Overview  Goal: Plan of Care Review  Outcome: Outcome(s) achieved Date Met: 10/19/18   10/19/18 3370   Coping/Psychosocial   Plan of Care Reviewed With patient   Plan of Care Review   Progress improving   OTHER   Outcome Summary OT evaluation completed and pt. educated on spinal precautions, log roll, home safety, AE/AD use with ADL tasks and transfers. Post education pt. able to demonstrate back sometime needing second review. No further skilled OT services needed. Pt. plans home at discharge.

## 2018-10-19 NOTE — THERAPY DISCHARGE NOTE
Acute Care - Occupational Therapy Initial Eval/Discharge  [facility]     Patient Name: Gordy Veliz  : 1955  MRN: [id number]  Today's Date: 10/19/2018  Onset of Illness/Injury or Date of Surgery: 10/17/18  Date of Referral to OT: 10/18/18  Referring Physician: MD Guille      Admit Date: 10/17/2018       ICD-10-CM ICD-9-CM   1. Impaired functional mobility, balance, gait, and endurance Z74.09 V49.89   2. Impaired mobility and ADLs Z74.09 799.89     Patient Active Problem List   Diagnosis   • Multilevel degenerative disc disease   • Bulging lumbar disc   • Anxiety   • Spinal stenosis of lumbar region without neurogenic claudication   • Lumbar radiculopathy   • Scoliosis (and kyphoscoliosis), idiopathic   • Spondylolisthesis of lumbar region   • Acquired spondylolysis     Past Medical History:   Diagnosis Date   • Arrhythmia    • COPD (chronic obstructive pulmonary disease) (CMS/HCC)     very mild due to history of smoking    • Hypertension    • Low back pain    • Rupture of eye     left-macular-no treatment or surgery    • Wears glasses      Past Surgical History:   Procedure Laterality Date   • COLONOSCOPY     • LUMBAR DISC SURGERY      [facility] ()   • LUMBAR DISCECTOMY FUSION INSTRUMENTATION N/A 10/17/2018    Procedure: LUMBAR LAMINECTOMY POSTERIOR LUMBAR INTERBODY FUSION L2, L3, L4 FOR LEFT INTRAFORAMINAL DISEASE;  Surgeon: Victor M aHn MD;  Location: [facility] OR;  Service: Neurosurgery   • TN MYELOGRAPHY VIA LUMBAR INJECT RS&I LUMBOSACRAL N/A 2018    Procedure: IR myelogram, lumbar;  Surgeon: Robert Mullins MD;  Location: [facility] CATH INVASIVE LOCATION;  Service: Interventional Radiology   • SKIN BIOPSY Right     rt arm-benign   • TONSILLECTOMY     • TOTAL HIP ARTHROPLASTY Right 2018    [place], [place]          OT ASSESSMENT FLOWSHEET (last 72 hours)      Occupational Therapy Evaluation     Row Name 10/19/18 3412                   OT Evaluation  Time/Intention    Subjective Information complains of;pain  -ROGER        Document Type evaluation;therapy note (daily note);discharge evaluation/summary  -ROGER        Mode of Treatment individual therapy;occupational therapy  -ROGER        Patient Effort good  -ROGER        Symptoms Noted During/After Treatment none  -ROGER        Comment RN notified pt. with high pain level throughout and pt. wanting more pain meds.  -ROGER           General Information    Patient Profile Reviewed? yes  -ROGER        Onset of Illness/Injury or Date of Surgery 10/17/18  -ROGER        Referring Physician MD Guille  -ROGER        Patient Observations alert;cooperative;agree to therapy  -ROGER        Patient/Family Observations Pt. supine in bed, MIKAEL drain, IV heplocked, tele.  -ROGER        General Observations of Patient No family present.  -ROGER        Prior Level of Function min assist:;all household mobility;community mobility;gait;transfer;ADL's;shopping;home management;independent:;driving   Limited HM being done, Electric chart to shop.  pain all  -ROGER        Equipment Currently Used at Home cane, straight;walker, rolling;hospital bed;grab bar;bath bench   was not using  -ROGER        Pertinent History of Current Functional Problem Pt. present for surgical managment of back pain and dysfunction with symptoms into LLE that failed to improve with conservative managment.  DDD with scoliosis.  s/p L2, L3 laminectomy, L2-3, L3-4 decompression with fusion.   -ROGER        Existing Precautions/Restrictions fall;spinal   MIKAEL drain, brace on when OOB  -ROGER        Equipment Issued to Patient reacher;sock aid   LH bath sponge  -ROGER        Risks Reviewed patient:;LOB;increased discomfort;lines disloged  -ROGER        Benefits Reviewed patient:;improve function;increase independence;increase knowledge  -ROGER        Barriers to Rehab none identified  -ROGER           Relationship/Environment    Lives With alone  -ROGER        Primary Roles/Responsibilities disabled  -ROGER        Concerns About  Impact on Relationships Per pt. will have someone check in on him, but cannot assist much.  -ROGER           Resource/Environmental Concerns    Current Living Arrangements home/apartment/condo  -ROGER           Home Main Entrance    Number of Stairs, Main Entrance four  -ROGER        Stair Railings, Main Entrance none  -ROGER           Cognitive Assessment/Interventions    Additional Documentation Cognitive Assessment/Intervention (Group)  -ROGER           Cognitive Assessment/Intervention- PT/OT    Orientation Status (Cognition) oriented x 4  -ROGER        Follows Commands (Cognition) WFL  -ROGER        Safety Deficit (Cognitive) insight into deficits/self awareness;safety precautions awareness;safety precautions follow-through/compliance  -ROGER        Personal Safety Interventions fall prevention program maintained;gait belt;nonskid shoes/slippers when out of bed  -ROGER           Safety Issues, Functional Mobility    Safety Issues Affecting Function (Mobility) safety precaution awareness;safety precautions follow-through/compliance  -ROGER        Impairments Affecting Function (Mobility) pain;strength  -ROGER           Bed Mobility Assessment/Treatment    Bed Mobility Assessment/Treatment supine-sit;sit-supine;scooting/bridging  -ROGER        Scooting/Bridging Champion (Bed Mobility) supervision;minimum assist (75% patient effort)   pt. able to scoot to EOB, but not back well with high bed  -ROGER        Supine-Sit Champion (Bed Mobility) supervision;verbal cues;nonverbal cues (demo/gesture)  -ROGER        Sit-Supine Champion (Bed Mobility) supervision;verbal cues;nonverbal cues (demo/gesture)  -ROGER        Comment (Bed Mobility) bed placed flat with no rail and high position as at home.  Pt. difficulty rolling just using mattress and constant cueing to keep spinal precuaitons intially and had to repractice up and down 2nd time for pt. to demostrate good technique.  Pt. difficulty scooting hip back in bed when bed raised height at home.  -ROGER            Functional Mobility    Functional Mobility- Ind. Level supervision required  -        Functional Mobility- Device rolling walker  -        Functional Mobility-Distance (Feet) --   200+  -ROGER        Functional Mobility- Safety Issues step length decreased  -           Transfer Assessment/Treatment    Transfer Assessment/Treatment sit-stand transfer;stand-sit transfer;toilet transfer;bathtub transfer  -ROGER        Comment (Transfers) cues for correct foot and hand positioning.  -ROGER           Sit-Stand Transfer    Sit-Stand [place] (Transfers) supervision;verbal cues  -ROGER        Assistive Device (Sit-Stand Transfers) walker, front-wheeled  -ROGER           Stand-Sit Transfer    Stand-Sit [place] (Transfers) supervision;verbal cues  -ROGER        Assistive Device (Stand-Sit Transfers) walker, front-wheeled  -ROGER           Toilet Transfer    Type (Toilet Transfer) sit-stand;stand-sit  -ROGER        [place] Level (Toilet Transfer) supervision;verbal cues;nonverbal cues (demo/gesture)  -        Assistive Device (Toilet Transfer) grab bars/safety frame;raised toilet seat  -           Bathtub Transfer    Type (Bathtub Transfer) --   educ. pt. how use tub bench and/or step in  -ROGER        [place] Level (Bathtub Transfer) --   pt. declined actual practice of transfer  -           ADL Assessment/Intervention    07113 - OT Self Care/Mgmt Minutes 20  -        BADL Assessment/Intervention lower body dressing;bathing;upper body dressing  -           Bathing Assessment/Intervention    Comment (Bathing) OT simulated for pt. how to use LH bath sponge and aD to bath at home.  -           Upper Body Dressing Assessment/Training    Upper Body Dressing [place] Level doff;don;supervision   brace  -        Upper Body Dressing Position edge of bed sitting  -        Comment (Upper Body Dressing) Pt. educated on how to dulce and doff brace and pt. demonstrated back with cues.  -ROGER           Lower Body  Dressing Assessment/Training    Lower Body Dressing [place] Level doff;don;pants/bottoms;shoes/slippers;socks;set up;supervision;verbal cues;nonverbal cues (demo/gesture)  -ROGER        Assistive Devices (Lower Body Dressing) reacher;sock-aid  -ROGER        Lower Body Dressing Position edge of bed sitting  -ROGER        Comment (Lower Body Dressing) Pt. able to cross up one leg to dulce and doff sock, but needed AE for second leg with old THR.  Pt. able to dangle pants and dulce and doff and cross up foot and keep prec to dulce shoe.  All done post OT educ. on how to use AE or perform adaptive method to keep spinal prec.  -ROGER           BADL Safety/Performance    Impairments, BADL Safety/Performance pain;strength;range of motion  -ROGER        Skilled BADL Treatment/Intervention adaptive equipment training;compensatory training  -ROGER        Progress in BADL Status independence level;use of compensatory strategies;use of adaptive equipment  -ROGER           General ROM    GENERAL ROM COMMENTS BUE intact AROM  -ROGER           MMT (Manual Muscle Testing)    General MMT Comments BUE intact strength for ADL;s and transfers.  -ROGER           Motor Assessment/Interventions    Additional Documentation Therapeutic Exercise (Group);Therapeutic Exercise Interventions (Group)  -ROGER           Therapeutic Exercise    35978 - OT Therapeutic Activity Minutes 20  -ROGER           Therapeutic Exercise    Comment (Therapeutic Exercise) reviewed exercises, but pt. declined to attempt.  -ROGER           Sensory Assessment/Intervention    Sensory General Assessment no sensation deficits identified   Pt. did not report any numbness or tingling BUE  -ROGER           Positioning and Restraints    Pre-Treatment Position in bed  -ROGER        Post Treatment Position bed  -ROGER        In Bed supine;call light within reach;encouraged to call for assist;pillow between legs;SCD pump applied  -ROGER           Pain Scale: Numbers Pre/Post-Treatment    Pain Scale: Numbers, Pretreatment  9/10  -ROGER        Pain Scale: Numbers, Post-Treatment 9/10  -ROGER        Pain Location - Orientation lower  -ROGER        Pain Location back  -ROGER        Pain Intervention(s) Repositioned   pt. appears to overrate his pain.  -ROGER           Wound 10/17/18 1215 Other (See comments) back incision    Wound - Properties Group Date first assessed: 10/17/18  -WS Time first assessed: 1215  -WS Side: Other (See comments)  -WS Location: back  -WS Type: incision  -WS       Plan of Care Review    Plan of Care Reviewed With patient  -ROGER           Clinical Impression (OT)    Date of Referral to OT 10/18/18  -ROGER        OT Diagnosis impaired ADL and transfer independence, decreased knowledge.  S/P spinal sx.  -ROGER        Patient/Family Goals Statement (OT Eval) Pt. wants to return home alone and perform daily task without assist.  -ROGER        Criteria for Skilled Therapeutic Interventions Met (OT Eval) yes;treatment indicated  -ROGER        Rehab Potential (OT Eval) good, to achieve stated therapy goals  -ROGER        Therapy Frequency (OT Eval) evaluation only  -ROGER        Care Plan Review (OT) evaluation/treatment results reviewed;care plan/treatment goals reviewed;risks/benefits reviewed;current/potential barriers reviewed;patient/other agree to care plan  -ROGER        Anticipated Discharge Disposition (OT) home with assist;home with home health   due to living alone HH follow may be beneficial  -ROGER           Vital Signs    Posttreatment Heart Rate (beats/min) 76  -ROGER        Post SpO2 (%) 98  -ROGER           Planned OT Interventions    Planned Therapy Interventions (OT Eval) adaptive equipment training;BADL retraining;transfer/mobility retraining;patient/caregiver education/training  -ROGER           OT Goals    Bed Mobility Goal Selection (OT) bed mobility, OT goal 1  -ROGER        Transfer Goal Selection (OT) transfer, OT goal 1  -ROGER        Dressing Goal Selection (OT) dressing, OT goal 1  -ROGER        Additional Documentation Functional Mobility  Selection (OT) (Row)  -ROGER           Bed Mobility Goal 1 (OT)    Activity/Assistive Device (Bed Mobility Goal 1, OT) rolling to left;sidelying to sit/sit to sidelying;sit to sidelying  -ROGER        [place] Level/Cues Needed (Bed Mobility Goal 1, OT) verbal cues required;tactile cues required;contact guard assist  -ROGER        Time Frame (Bed Mobility Goal 1, OT) long term goal (LTG);1 day  -ROGER        Progress/Outcomes (Bed Mobility Goal 1, OT) goal met  -ROGER           Transfer Goal 1 (OT)    Activity/Assistive Device (Transfer Goal 1, OT) sit-to-stand/stand-to-sit;toilet;walker, rolling   RTS, grab bar to simulate home setup  -ROGER        [place] Level/Cues Needed (Transfer Goal 1, OT) supervision required;verbal cues required  -ROGER        Time Frame (Transfer Goal 1, OT) long term goal (LTG);1 day  -ROGER        Progress/Outcome (Transfer Goal 1, OT) goal met  -ROGER           Dressing Goal 1 (OT)    Activity/Assistive Device (Dressing Goal 1, OT) lower body dressing;reacher;sock-aid  -ROGER        [place]/Cues Needed (Dressing Goal 1, OT) supervision required;tactile cues required;verbal cues required  -ROGER        Time Frame (Dressing Goal 1, OT) long term goal (LTG);1 day  -ROGER        Progress/Outcome (Dressing Goal 1, OT) goal met  -ROGER           Patient Education Goal (OT)    Activity (Patient Education Goal, OT) log roll, AE use, AD use, spinal precuations  -ROGER        [place]/Cues/Accuracy (Memory Goal 2, OT) verbalizes understanding;demonstrates adequately  -ROGER        Time Frame (Patient Education Goal, OT) long term goal (LTG);1 day  -ROGER        Progress/Outcome (Patient Education Goal, OT) goal met  -ROGER           Living Environment    Home Accessibility stairs to enter home;tub/shower is not walk in   pt. does have comfort height toilet  -ROGER          User Key  (r) = Recorded By, (t) = Taken By, (c) = Cosigned By    Initials Name Effective Dates    ROGER Meghann Hoang, OT 06/08/18 -     Jacquelyn Urban,  RN 08/25/17 -           Occupational Therapy Education     Title: PT OT SLP Therapies (Done)     Topic: Occupational Therapy (Done)     Point: ADL training (Done)     Description: Instruct learner(s) on proper safety adaptation and remediation techniques during self care or transfers.   Instruct in proper use of assistive devices.   Learning Progress Summary     Learner Status Readiness Method Response Comment Documented by    Patient Done Acceptance E,D,H VU,DU spinal precuation, AE and AD use, brace dulce and doff, posture, log roll.  10/19/18 1340          Point: Home exercise program (Done)     Description: Instruct learner(s) on appropriate technique for monitoring, assisting and/or progressing therapeutic exercises/activities.   Learning Progress Summary     Learner Status Readiness Method Response Comment Documented by    Patient Done Acceptance E,D,H VU,DU spinal precuation, AE and AD use, brace dulce and doff, posture, log roll.  10/19/18 1340          Point: Precautions (Done)     Description: Instruct learner(s) on prescribed precautions during self-care and functional transfers.   Learning Progress Summary     Learner Status Readiness Method Response Comment Documented by    Patient Done Acceptance E,D,H VU,DU spinal precuation, AE and AD use, brace dulce and doff, posture, log roll.  10/19/18 1340          Point: Body mechanics (Done)     Description: Instruct learner(s) on proper positioning and spine alignment during self-care, functional mobility activities and/or exercises.   Learning Progress Summary     Learner Status Readiness Method Response Comment Documented by    Patient Done Acceptance E,D,H VU,DU spinal precuation, AE and AD use, brace dulce and doff, posture, log roll.  10/19/18 1340                      User Key     Initials Effective Dates Name Provider Type Discipline     06/08/18 -  Meghann Hoang OT Occupational Therapist OT                OT Recommendation and Plan  Outcome  Summary/Treatment Plan (OT)  Anticipated Discharge Disposition (OT): home, home with home health  Planned Therapy Interventions (OT Eval): adaptive equipment training, BADL retraining, transfer/mobility retraining, patient/caregiver education/training  Therapy Frequency (OT Eval): evaluation only  Plan of Care Review  Plan of Care Reviewed With: patient  Plan of Care Reviewed With: patient  Outcome Summary: OT evaluation completed and pt. educated on spinal precautions, log roll, home safety, AE/AD use with ADL tasks and transfers.  Post education pt. able to demonstrate back sometime needing second review.  No further skilled OT services needed.  Pt. plans home at discharge.           OT Rehab Goals     Row Name 10/19/18 1340             Bed Mobility Goal 1 (OT)    Activity/Assistive Device (Bed Mobility Goal 1, OT) rolling to left;sidelying to sit/sit to sidelying;sit to sidelying  -ROGER      [place] Level/Cues Needed (Bed Mobility Goal 1, OT) verbal cues required;tactile cues required;contact guard assist  -ROGER      Time Frame (Bed Mobility Goal 1, OT) long term goal (LTG);1 day  -ROGER      Progress/Outcomes (Bed Mobility Goal 1, OT) goal met  -ROGER         Transfer Goal 1 (OT)    Activity/Assistive Device (Transfer Goal 1, OT) sit-to-stand/stand-to-sit;toilet;walker, rolling   RTS, grab bar to simulate home setup  -ROGER      [place] Level/Cues Needed (Transfer Goal 1, OT) supervision required;verbal cues required  -ROGER      Time Frame (Transfer Goal 1, OT) long term goal (LTG);1 day  -ROGER      Progress/Outcome (Transfer Goal 1, OT) goal met  -ROGER         Dressing Goal 1 (OT)    Activity/Assistive Device (Dressing Goal 1, OT) lower body dressing;reacher;sock-aid  -ROGER      [place]/Cues Needed (Dressing Goal 1, OT) supervision required;tactile cues required;verbal cues required  -ROGER      Time Frame (Dressing Goal 1, OT) long term goal (LTG);1 day  -ROGER      Progress/Outcome (Dressing Goal 1, OT) goal met  -ROGER          Patient Education Goal (OT)    Activity (Patient Education Goal, OT) log roll, AE use, AD use, spinal precuations  -ROGER      [place]/Cues/Accuracy (Memory Goal 2, OT) verbalizes understanding;demonstrates adequately  -ROGER      Time Frame (Patient Education Goal, OT) long term goal (LTG);1 day  -ROGER      Progress/Outcome (Patient Education Goal, OT) goal met  -ROGER        User Key  (r) = Recorded By, (t) = Taken By, (c) = Cosigned By    Initials Name Provider Type Discipline    Meghann Cross, OT Occupational Therapist OT                Outcome Measures     Row Name 10/19/18 1340 10/19/18 1037 10/18/18 0920       How much help from another person do you currently need...    Turning from your back to your side while in flat bed without using bedrails?  -- 3  -MJ 3  -MJ    Moving from lying on back to sitting on the side of a flat bed without bedrails?  -- 3  -MJ 3  -MJ    Moving to and from a bed to a chair (including a wheelchair)?  -- 3  -MJ 3  -MJ    Standing up from a chair using your arms (e.g., wheelchair, bedside chair)?  -- 3  -MJ 3  -MJ    Climbing 3-5 steps with a railing?  -- 3  -MJ 3  -MJ    To walk in hospital room?  -- 3  -MJ 3  -MJ    AM-PAC 6 Clicks Score  -- 18  -MJ 18  -MJ       How much help from another is currently needed...    Putting on and taking off regular lower body clothing? 3   indep with AE  -ROGER  --  --    Bathing (including washing, rinsing, and drying) 3  -ROGER  --  --    Toileting (which includes using toilet bed pan or urinal) 4  -RGOER  --  --    Putting on and taking off regular upper body clothing 4  -ROGER  --  --    Taking care of personal grooming (such as brushing teeth) 4  -ROGER  --  --    Eating meals 4  -ROGER  --  --    Score 22  -ROGER  --  --       Functional Assessment    Outcome Measure Options AM-PAC 6 Clicks Daily Activity (OT)  -ROGER AM-PAC 6 Clicks Basic Mobility (PT)  -MJ AM-PAC 6 Clicks Basic Mobility (PT)  -MJ      User Key  (r) = Recorded By, (t) = Taken By, (c) =  Cosigned By    Initials Name Provider Type    Meghann Cross, OT Occupational Therapist    Kai Sosa, PT Physical Therapist          Time Calculation:         Time Calculation- OT     Row Name 10/19/18 1340 10/19/18 1037          Time Calculation- OT    OT Start Time 1340  -ROGER  --     OT Received On 10/19/18  -ROGER  --        Timed Charges    76710 - Gait Training Minutes   -- 20  -MJ     16191 - OT Therapeutic Activity Minutes 20  -ROGER  --     77950 - OT Self Care/Mgmt Minutes 20  -ROGER  --       User Key  (r) = Recorded By, (t) = Taken By, (c) = Cosigned By    Initials Name Provider Type    Meghann Cross, OT Occupational Therapist    Kai Sosa, PT Physical Therapist        Therapy Suggested Charges     Code   Minutes Charges    11132 (CPT®) Hc Ot Neuromusc Re Education Ea 15 Min      33641 (CPT®) Hc Ot Ther Proc Ea 15 Min      96012 (CPT®) Hc Ot Therapeutic Act Ea 15 Min 20 2    95390 (CPT®) Hc Ot Manual Therapy Ea 15 Min      85861 (CPT®) Hc Ot Iontophoresis Ea 15 Min      66557 (CPT®) Hc Ot Elec Stim Ea-Per 15 Min      43879 (CPT®) Hc Ot Ultrasound Ea 15 Min      39807 (CPT®) Hc Ot Self Care/Mgmt/Train Ea 15 Min 20 1    Total  40 3        Therapy Charges for Today     Code Description Service Date Service Provider Modifiers Qty    07819517347 HC OT THERAPEUTIC ACT EA 15 MIN 10/19/2018 Meghann Hoang OT GO 2    59807592626 HC OT SELF CARE/MGMT/TRAIN EA 15 MIN 10/19/2018 Meghann Hoang OT GO 1    15063194728 HC OT EVAL LOW COMPLEXITY 4 10/19/2018 Meghann Hoang OT GO 1               OT Discharge Summary  Anticipated Discharge Disposition (OT): home, home with home health  Reason for Discharge: All goals achieved  Outcomes Achieved: Able to achieve all goals within established timeline    Meghann Hoang OT  10/19/2018

## 2018-10-19 NOTE — THERAPY TREATMENT NOTE
Acute Care - Physical Therapy Treatment Note  [facility]     Patient Name: Gordy Veliz  : 1955  MRN: [id number]  Today's Date: 10/19/2018  Onset of Illness/Injury or Date of Surgery: 10/17/18  Date of Referral to PT: 10/18/18  Referring Physician: MD Guille    Admit Date: 10/17/2018    Visit Dx:    ICD-10-CM ICD-9-CM   1. Impaired functional mobility, balance, gait, and endurance Z74.09 V49.89     Patient Active Problem List   Diagnosis   • Multilevel degenerative disc disease   • Bulging lumbar disc   • Anxiety   • Spinal stenosis of lumbar region without neurogenic claudication   • Lumbar radiculopathy   • Scoliosis (and kyphoscoliosis), idiopathic   • Spondylolisthesis of lumbar region   • Acquired spondylolysis       Therapy Treatment          Rehabilitation Treatment Summary     Row Name 10/19/18 1037             Treatment Time/Intention    Discipline physical therapist  -MJ      Document Type therapy note (daily note)  -MJ      Subjective Information complains of;pain  -MJ      Mode of Treatment physical therapy  -MJ      Patient/Family Observations Pt sidelying in bed, MIKAEL drain, IV heplocked, tele  -MJ      Care Plan Review patient/other agree to care plan  -MJ      Patient Effort excellent  -MJ      Existing Precautions/Restrictions fall;spinal;other (see comments)   MIKAEL drain  -MJ      Treatment Considerations/Comments Pt to be fitted for LSO today  -MJ      Recorded by [MJ] Kai Burks, PT 10/19/18 1128      Row Name 10/19/18 1037             Cognitive Assessment/Intervention- PT/OT    Orientation Status (Cognition) oriented x 4  -MJ      Follows Commands (Cognition) WFL  -MJ      Recorded by [MJ] Kai Burks, PT 10/19/18 1128      Row Name 10/19/18 1037             Safety Issues, Functional Mobility    Impairments Affecting Function (Mobility) pain;strength  -MJ      Recorded by [MJ] Kai Burks, PT 10/19/18 1128      Row Name 10/19/18 1037             Bed Mobility Assessment/Treatment     Supine-Sit Pettis (Bed Mobility) minimum assist (75% patient effort);verbal cues  -MJ      Sit-Supine Pettis (Bed Mobility) not tested   Pt UIC  -MJ      Assistive Device (Bed Mobility) bed rails  -MJ      Comment (Bed Mobility) Pt performed log roll out of bed, verbal cues for sequencing. Pt required UE support to bring trunk to sitting from sidelying  -MJ      Recorded by [MJ] Kai Burks, PT 10/19/18 1128      Row Name 10/19/18 1037             Transfer Assessment/Treatment    Transfer Assessment/Treatment sit-stand transfer;stand-sit transfer  -MJ      Comment (Transfers) Verbal cues for correct hand placement  -MJ      Recorded by [MJ] Kai Burks, PT 10/19/18 1128      Row Name 10/19/18 1037             Sit-Stand Transfer    Sit-Stand Pettis (Transfers) stand by assist;verbal cues  -MJ      Assistive Device (Sit-Stand Transfers) walker, front-wheeled  -MJ      Recorded by [MJ] Kai Burks, PT 10/19/18 1128      Row Name 10/19/18 1037             Stand-Sit Transfer    Stand-Sit Pettis (Transfers) stand by assist;verbal cues  -MJ      Assistive Device (Stand-Sit Transfers) walker, front-wheeled  -MJ      Recorded by [MJ] Kai Burks, PT 10/19/18 1128      Row Name 10/19/18 1037             Gait/Stairs Assessment/Training    84050 - Gait Training Minutes  20  -MJ      Pettis Level (Gait) contact guard;verbal cues  -MJ2      Assistive Device (Gait) walker, front-wheeled  -MJ2      Distance in Feet (Gait) 400  -MJ2      Pattern (Gait) step-through  -MJ2      Deviations/Abnormal Patterns (Gait) bilateral deviations;annie decreased  -MJ2      Bilateral Gait Deviations heel strike decreased  -MJ2      Pettis Level (Stairs) contact guard;verbal cues  -MJ2      Handrail Location (Stairs) both sides  -MJ2      Number of Steps (Stairs) 8  -MJ2      Ascending Technique (Stairs) step-over-step  -MJ2      Descending Technique (Stairs) step-over-step  -MJ2      Comment  (Gait/Stairs) Pt demo step through gait pattern at slow pace. Verbal cues for upright posture and increased LE weight bearing, improvement noted. Pt performed stairs reciprocally. After performing 3 steps up and over, pt begins to ascend 2 steps backward despite cues not to. Cues for safety required  -MJ2      Recorded by [MJ] Kai Burks, PT 10/19/18 1133  [MJ2] Kai Burks, PT 10/19/18 1128      Row Name 10/19/18 1037             Therapeutic Exercise    63499 - PT Therapeutic Exercise Minutes 26  -MJ      35295 - PT Therapeutic Activity Minutes 9  -MJ      Recorded by [MJ] Kai Burks, PT 10/19/18 1133      Row Name 10/19/18 1037             Therapeutic Exercise    Lower Extremity (Therapeutic Exercise) gluteal sets;heel slides, bilateral;quad sets, bilateral  -MJ      Lower Extremity Range of Motion (Therapeutic Exercise) hip internal/external rotation, bilateral;ankle dorsiflexion/plantar flexion, bilateral  -MJ      Core Strength (Therapeutic Exercise) abdominal bracing   bent knee fallout, arms overhead  -MJ      Exercise Type (Therapeutic Exercise) AROM (active range of motion);isometric contraction, static  -MJ      Position (Therapeutic Exercise) seated  -MJ      Sets/Reps (Therapeutic Exercise) 30x each (per pt request)  -MJ      Comment (Therapeutic Exercise) Cues for technique  -MJ      Recorded by [MJ] Kai Burks, PT 10/19/18 1128      Row Name 10/19/18 1037             Positioning and Restraints    Pre-Treatment Position in bed  -MJ      Post Treatment Position chair  -MJ      In Chair notified nsg;reclined;call light within reach;encouraged to call for assist;exit alarm on  -MJ      Recorded by [MJ] Kai Burks, PT 10/19/18 1128      Row Name 10/19/18 1037             Pain Scale: Numbers Pre/Post-Treatment    Pain Scale: Numbers, Pretreatment 9/10  -MJ      Pain Location - Orientation lower  -MJ      Pain Location back  -MJ      Pain Intervention(s) MD notified (Comment);Ambulation/increased  activity  -MJ      Recorded by [MJ] Kai Burks, PT 10/19/18 1128      Row Name                Wound 10/17/18 1215 Other (See comments) back incision    Wound - Properties Group Date first assessed: 10/17/18 [WS] Time first assessed: 1215 [WS] Side: Other (See comments) [WS] Location: back [WS] Type: incision [WS] Recorded by:  [WS] Jacquelyn Framer, RN 10/17/18 1215    Row Name 10/19/18 1037             Plan of Care Review    Plan of Care Reviewed With patient  -MJ      Recorded by [MJ] Kai Burks, PT 10/19/18 1128      Row Name 10/19/18 1037             Outcome Summary/Treatment Plan (PT)    Daily Summary of Progress (PT) progress toward functional goals is good  -MJ      Recorded by [GIULIA] Kai Burks, PT 10/19/18 1128        User Key  (r) = Recorded By, (t) = Taken By, (c) = Cosigned By    Initials Name Effective Dates Discipline     Kai Burks, PT 04/03/18 -  PT    Jacquelyn Urban, RN 08/25/17 -  Nurse          Wound 10/17/18 1215 Other (See comments) back incision (Active)   Dressing Appearance dry;intact;no drainage 10/19/2018  8:21 AM             Physical Therapy Education     Title: PT OT SLP Therapies (Done)     Topic: Physical Therapy (Done)     Point: Mobility training (Done)    Learning Progress Summary     Learner Status Readiness Method Response Comment Documented by    Patient Done Acceptance E,D VU,NR Reviewed back precautions, HEP, gait mechanics, stairs sequencing, safety. Pt with multiple questions re: ther ex and safe positions during mobility  10/19/18 1037     Done Acceptance E,TB VU   10/18/18 1615     Done Acceptance E,D,H VU,NR Edu re: back precautions, log roll, HEP, gait mechanics, benefits of mobility. Issued HEP handout  10/18/18 0920          Point: Home exercise program (Done)    Learning Progress Summary     Learner Status Readiness Method Response Comment Documented by    Patient Done Acceptance E,D VU,NR Reviewed back precautions, HEP, gait mechanics, stairs  sequencing, safety. Pt with multiple questions re: ther ex and safe positions during mobility  10/19/18 1037     Done Acceptance E,TB VU   10/18/18 1615     Done Acceptance E,D,H VU,NR Edu re: back precautions, log roll, HEP, gait mechanics, benefits of mobility. Issued HEP handout  10/18/18 0920          Point: Body mechanics (Done)    Learning Progress Summary     Learner Status Readiness Method Response Comment Documented by    Patient Done Acceptance E,D VU,NR Reviewed back precautions, HEP, gait mechanics, stairs sequencing, safety. Pt with multiple questions re: ther ex and safe positions during mobility  10/19/18 1037     Done Acceptance E,TB VU   10/18/18 1615     Done Acceptance E,D,H VU,NR Edu re: back precautions, log roll, HEP, gait mechanics, benefits of mobility. Issued HEP handout  10/18/18 0920          Point: Precautions (Done)    Learning Progress Summary     Learner Status Readiness Method Response Comment Documented by    Patient Done Acceptance E,D VU,NR Reviewed back precautions, HEP, gait mechanics, stairs sequencing, safety. Pt with multiple questions re: ther ex and safe positions during mobility  10/19/18 1037     Done Acceptance E,TB VU   10/18/18 1615     Done Acceptance E,D,H VU,NR Edu re: back precautions, log roll, HEP, gait mechanics, benefits of mobility. Issued HEP handout  10/18/18 0920                      User Key     Initials Effective Dates Name Provider Type Discipline     04/03/18 -  Kai Burks, PT Physical Therapist PT     02/06/17 -  Edinson Cooney RN Registered Nurse Nurse                    PT Recommendation and Plan  Anticipated Discharge Disposition (PT): home with assist  Planned Therapy Interventions (PT Eval): balance training, bed mobility training, gait training, home exercise program, patient/family education, stair training, strengthening, transfer training  Therapy Frequency (PT Clinical Impression): daily  Outcome Summary/Treatment  Plan (PT)  Daily Summary of Progress (PT): progress toward functional goals is good  Anticipated Discharge Disposition (PT): home with assist  Referral Needed to Another Service (PT): occupational therapy  Plan of Care Reviewed With: patient  Progress: improving  Outcome Summary: Pt increased gait distance to 400 feet with CGA and RW. Pt progressed to stair training and progressed ther ex and reps this date. Increased time spent educating pt. Will continue to progress mobility as able.           Outcome Measures     Row Name 10/19/18 1037 10/18/18 0920          How much help from another person do you currently need...    Turning from your back to your side while in flat bed without using bedrails? 3  -MJ 3  -MJ     Moving from lying on back to sitting on the side of a flat bed without bedrails? 3  -MJ 3  -MJ     Moving to and from a bed to a chair (including a wheelchair)? 3  -MJ 3  -MJ     Standing up from a chair using your arms (e.g., wheelchair, bedside chair)? 3  -MJ 3  -MJ     Climbing 3-5 steps with a railing? 3  -MJ 3  -MJ     To walk in hospital room? 3  -MJ 3  -MJ     AM-PAC 6 Clicks Score 18  -MJ 18  -MJ        Functional Assessment    Outcome Measure Options AM-PAC 6 Clicks Basic Mobility (PT)  -MJ AM-PAC 6 Clicks Basic Mobility (PT)  -MJ       User Key  (r) = Recorded By, (t) = Taken By, (c) = Cosigned By    Initials Name Provider Type    Kai Sosa, PT Physical Therapist           Time Calculation:         PT Charges     Row Name 10/19/18 1037             Time Calculation    Start Time 1037  -MJ      PT Received On 10/19/18  -MJ      PT Goal Re-Cert Due Date 10/28/18  -MJ         Time Calculation- PT    Total Timed Code Minutes- PT 55 minute(s)  -MJ         Timed Charges    62635 - PT Therapeutic Exercise Minutes 26  -MJ      63001 - Gait Training Minutes  20  -MJ      89653 - PT Therapeutic Activity Minutes 9  -MJ        User Key  (r) = Recorded By, (t) = Taken By, (c) = Cosigned By    Initials  Name Provider Type    MJ Kai Burks, PT Physical Therapist        Therapy Suggested Charges     Code   Minutes Charges    67768 (CPT®) Hc Pt Neuromusc Re Education Ea 15 Min      00264 (CPT®) Hc Pt Ther Proc Ea 15 Min 26 2    51732 (CPT®) Hc Gait Training Ea 15 Min 20 1    36204 (CPT®) Hc Pt Therapeutic Act Ea 15 Min 9 1    51558 (CPT®) Hc Pt Manual Therapy Ea 15 Min      13764 (CPT®) Hc Pt Iontophoresis Ea 15 Min      84577 (CPT®) Hc Pt Elec Stim Ea-Per 15 Min      55568 (CPT®) Hc Pt Ultrasound Ea 15 Min      81666 (CPT®) Hc Pt Self Care/Mgmt/Train Ea 15 Min      90676 (CPT®) Hc Pt Prosthetic (S) Train Initial Encounter, Each 15 Min      56680 (CPT®) Hc Pt Orthotic(S)/Prosthetic(S) Encounter, Each 15 Min      64267 (CPT®) Hc Orthotic(S) Mgmt/Train Initial Encounter, Each 15min      Total  55 4        Therapy Charges for Today     Code Description Service Date Service Provider Modifiers Qty    50747868227 HC PT EVAL LOW COMPLEXITY 3 10/18/2018 Kai Burks, PT GP 1    04454488365 HC GAIT TRAINING EA 15 MIN 10/18/2018 Kai Burks, PT GP 1    50778353445 HC PT THER PROC EA 15 MIN 10/18/2018 Kai Burks, PT GP 1    39716935447 HC GAIT TRAINING EA 15 MIN 10/19/2018 Kai Burks, PT GP 2    45507565581 HC PT THER PROC EA 15 MIN 10/19/2018 Kai Burks, PT GP 2          PT G-Codes  Outcome Measure Options: AM-PAC 6 Clicks Basic Mobility (PT)  AM-PAC 6 Clicks Score: 18    Kai Burks, PT  10/19/2018

## 2018-10-19 NOTE — PROGRESS NOTES
Continued Stay Note  [facility]     Patient Name: Gordy Veliz  MRN: [id number]  Today's Date: 10/19/2018    Admit Date: 10/17/2018          Discharge Plan     Row Name 10/19/18 1105       Plan    Plan Home    Patient/Family in Agreement with Plan yes    Plan Comments Spoke with patient in room.  His plan is still to go home at discharge.  He denies any needs at this time.  CM will continue to follow.  Herlinda Gracia RN [phone]              Discharge Codes    No documentation.       Expected Discharge Date and Time     Expected Discharge Date Expected Discharge Time    Oct 20, 2018             Herlinda Gracia RN

## 2018-10-19 NOTE — PLAN OF CARE
Problem: Patient Care Overview  Goal: Plan of Care Review   10/19/18 9058   Coping/Psychosocial   Plan of Care Reviewed With patient   Plan of Care Review   Progress improving   OTHER   Outcome Summary 64 yo male admitted 10/17/18 for lami. Pain control has been an issue, has been on long term extended release medications complicating acute pain control. Medications adjusted today by Dr. Han.  MIKAEL drain removed this evening.  Encouraged ambulation in hallways and up to chair for meals.  Pt to have IV abx doses d/t extended time MIKAEL was in place, 1st dose this afternoon. VSS, sinus arrhythmia on tele box 2223, voiding well, tolerating diet, and LBM 10/17/18. Anticipate home w/ family when medically ready.

## 2018-10-19 NOTE — PLAN OF CARE
Problem: Patient Care Overview  Goal: Plan of Care Review  Outcome: Ongoing (interventions implemented as appropriate)   10/19/18 1037   Coping/Psychosocial   Plan of Care Reviewed With patient   Plan of Care Review   Progress improving   OTHER   Outcome Summary Pt increased gait distance to 400 feet with CGA and RW. Pt progressed to stair training and progressed ther ex and reps this date. Increased time spent educating pt. Will continue to progress mobility as able.        Problem: Laminectomy/Foraminotomy/Discectomy (Adult)  Goal: Signs and Symptoms of Listed Potential Problems Will be Absent, Minimized or Managed (Laminectomy/Foraminotomy/Discectomy)  Outcome: Ongoing (interventions implemented as appropriate)   10/19/18 1037   Goal/Outcome Evaluation   Problems Assessed (Laminectomy/Laminotomy/Discectomy) functional decline/self-care deficit;pain   Problems Present (Laminectomy/otomy) functional decline/self-care deficit;pain

## 2018-10-19 NOTE — PROGRESS NOTES
HOD# : 2    No events last night  Stable reports uncomfortable back pain but leg pain is better with ambulation     Spondylolisthesis of lumbar region    Acquired spondylolysis      Temp:  [97 °F (36.1 °C)-99 °F (37.2 °C)] 97 °F (36.1 °C)  Heart Rate:  [68-82] 71  Resp:  [16-20] 16  BP: (103-138)/(50-75) 123/75  I/O last 3 completed shifts:  In: -   Out: 4245 [Urine:3850; Drains:395]  I/O this shift:  In: 240 [P.O.:240]  Out: 500 [Urine:500]  Vital signs were reviewed and documented in the chart      EXAM   Patient appeared in good neurologic function with normal comprehension   CN grossly intact  Moves all extremities to command        PLAN   Patient doing well expected pain given previous opioid use will increase Percocet to 10-2 hour continue long-acting opioid use while in hospital.    Drainage will even to this evening potentially DC later this evening or morning    [place] bracing for brace indicated LSO for support    Mobilize potential DC 1-2 days

## 2018-10-19 NOTE — PLAN OF CARE
Problem: Patient Care Overview  Goal: Plan of Care Review  Outcome: Ongoing (interventions implemented as appropriate)   10/19/18 0455   Coping/Psychosocial   Plan of Care Reviewed With patient   Plan of Care Review   Progress no change   OTHER   Outcome Summary Pt continues to have issues w pain control. Pt states pain is a constant 8 to 9 out of 10. Pain has been poorly controlled w PO and IV pain meds. Pt refused to have MIKAEL drain removed d/t pain being out of control. Pt VSS otherwise, and has slept on and off during shift. Pt continues to void well. Will continue to monitor.        Problem: Laminectomy/Foraminotomy/Discectomy (Adult)  Goal: Signs and Symptoms of Listed Potential Problems Will be Absent, Minimized or Managed (Laminectomy/Foraminotomy/Discectomy)  Outcome: Ongoing (interventions implemented as appropriate)

## 2018-10-20 VITALS
HEART RATE: 81 BPM | HEIGHT: 68 IN | RESPIRATION RATE: 16 BRPM | SYSTOLIC BLOOD PRESSURE: 121 MMHG | TEMPERATURE: 98.1 F | WEIGHT: 152 LBS | DIASTOLIC BLOOD PRESSURE: 55 MMHG | OXYGEN SATURATION: 95 % | BODY MASS INDEX: 23.04 KG/M2

## 2018-10-20 PROCEDURE — 99024 POSTOP FOLLOW-UP VISIT: CPT | Performed by: NEUROLOGICAL SURGERY

## 2018-10-20 PROCEDURE — 25010000003 CEFAZOLIN IN DEXTROSE 2-4 GM/100ML-% SOLUTION: Performed by: NEUROLOGICAL SURGERY

## 2018-10-20 RX ORDER — OXYCODONE HCL 10 MG/1
20 TABLET, FILM COATED, EXTENDED RELEASE ORAL EVERY 12 HOURS
Qty: 10 TABLET | Refills: 0 | Status: SHIPPED | OUTPATIENT
Start: 2018-10-20 | End: 2018-11-01

## 2018-10-20 RX ORDER — OXYCODONE AND ACETAMINOPHEN 10; 325 MG/1; MG/1
1 TABLET ORAL EVERY 4 HOURS PRN
Qty: 60 TABLET | Refills: 0 | Status: SHIPPED | OUTPATIENT
Start: 2018-10-20 | End: 2018-10-30

## 2018-10-20 RX ORDER — POLYETHYLENE GLYCOL 3350 17 G/17G
17 POWDER, FOR SOLUTION ORAL DAILY
Qty: 238 G | Refills: 0 | Status: SHIPPED | OUTPATIENT
Start: 2018-10-21 | End: 2018-11-01

## 2018-10-20 RX ORDER — METHOCARBAMOL 500 MG/1
1000 TABLET, FILM COATED ORAL 4 TIMES DAILY PRN
Qty: 90 TABLET | Refills: 0 | Status: SHIPPED | OUTPATIENT
Start: 2018-10-20 | End: 2018-11-15

## 2018-10-20 RX ADMIN — METHOCARBAMOL TABLETS 1000 MG: 500 TABLET, COATED ORAL at 00:21

## 2018-10-20 RX ADMIN — OXYCODONE HYDROCHLORIDE AND ACETAMINOPHEN 1 TABLET: 10; 325 TABLET ORAL at 08:26

## 2018-10-20 RX ADMIN — Medication 3 ML: at 08:27

## 2018-10-20 RX ADMIN — METHOCARBAMOL TABLETS 1000 MG: 500 TABLET, COATED ORAL at 08:26

## 2018-10-20 RX ADMIN — OXYCODONE HYDROCHLORIDE AND ACETAMINOPHEN 1 TABLET: 10; 325 TABLET ORAL at 10:10

## 2018-10-20 RX ADMIN — LISINOPRIL 40 MG: 20 TABLET ORAL at 08:26

## 2018-10-20 RX ADMIN — OXYCODONE HYDROCHLORIDE 20 MG: 20 TABLET, FILM COATED, EXTENDED RELEASE ORAL at 05:25

## 2018-10-20 RX ADMIN — MORPHINE SULFATE 100 MG: 100 TABLET, EXTENDED RELEASE ORAL at 05:25

## 2018-10-20 RX ADMIN — OXYCODONE HYDROCHLORIDE AND ACETAMINOPHEN 1 TABLET: 10; 325 TABLET ORAL at 00:21

## 2018-10-20 RX ADMIN — DIAZEPAM 5 MG: 5 TABLET ORAL at 03:42

## 2018-10-20 RX ADMIN — OXYCODONE HYDROCHLORIDE AND ACETAMINOPHEN 1 TABLET: 10; 325 TABLET ORAL at 03:42

## 2018-10-20 RX ADMIN — IBUPROFEN 800 MG: 800 TABLET, FILM COATED ORAL at 05:25

## 2018-10-20 RX ADMIN — POLYETHYLENE GLYCOL 3350 17 G: 17 POWDER, FOR SOLUTION ORAL at 08:27

## 2018-10-20 RX ADMIN — CEFAZOLIN SODIUM 2 G: 2 INJECTION, SOLUTION INTRAVENOUS at 03:44

## 2018-10-20 NOTE — PROGRESS NOTES
HOD# : 3    No events last night  STABLE    Spondylolisthesis of lumbar region    Acquired spondylolysis      Temp:  [98.1 °F (36.7 °C)-98.4 °F (36.9 °C)] 98.1 °F (36.7 °C)  Heart Rate:  [66-81] 81  Resp:  [16-20] 16  BP: (101-146)/(53-69) 121/55  I/O last 3 completed shifts:  In: 1200 [P.O.:1200]  Out: 3385 [Urine:3250; Drains:135]  I/O this shift:  In: 240 [P.O.:240]  Out: -   Vital signs were reviewed and documented in the chart      EXAM   Patient appeared in good neurologic function with normal comprehension   CN grossly intact  Moves all extremities to command    DRESSIN CDI    PLAN   DOING WELL   DC TODAY   DW PX OPIOID PLAN AND FU WITH PAIN MD

## 2018-10-20 NOTE — PLAN OF CARE
Problem: Patient Care Overview  Goal: Plan of Care Review  Pain control has been a main issue for the PT, requires pain meds Q 2 hours on top of the scheduled pain medications. A & O x 4. Mood/affect appropriate/pleasant. Resp reg/unlabored. Skin warm/ dry to touch. Dsgs to incision CDI. Voids without difficulty. Will cont to mx. Call light in reach.   10/20/18 0141   Coping/Psychosocial   Plan of Care Reviewed With patient   Plan of Care Review   Progress no change

## 2018-10-25 NOTE — DISCHARGE SUMMARY
[facility]    Date of Admission: 10/17/2018  Date of Discharge:  10/25/2018    Discharge Diagnosis:  Multilevel degenerative disc disease L2-3 L3 4 for treatment of intraforaminal left-sided disease    Procedures Performed  Procedure(s):  LUMBAR LAMINECTOMY POSTERIOR LUMBAR INTERBODY FUSION L2, L3, L4 FOR LEFT INTRAFORAMINAL DISEASE       Presenting Problem/History of Present Illness  Patient's well-known to me for a history of long-standing complicated back problems which sent him for second opinions for mid lumbar scoliosis she has a advanced history of pain tolerance taking over 100 mg of morphine equivalent daily.  He was admitted after refractory pain control was achieved and underwent elective surgery.      Hospital Course:   He was noted and underwent uncomplicated surgery.  He was transferred to the floor ambulate postop day 1 his lumbar drain was removed postoperative day one his pain management regimen was curtailed and eventually pain control was achieved he always remained neurologically intact he was passing gas and at the day of discharge she had good strength in his lower extremities his incision was appropriate without evidence of erythema and he was a file doing without assisted's and wanted to go home    Condition on Discharge:  Stable  Discharge to: Home    PATIENT SPECIFIC EDUCATION/PLAN:  1. Follow-up with Jamir BENEDICT in neurosurgical office  2. No driving until follow-up.  3. The patient may get incision wet in the shower beginning on 48 hours.   4. NO tub bathing or swimming until follow-up  5. Ice pack to incision(s) as needed for associated pain or swelling       Discharge Medications     Discharge Medications      New Medications      Instructions Start Date   CLEARLAX powder  Generic drug:  polyethylene glycol   17 g, Oral, Daily      methocarbamol 500 MG tablet  Commonly known as:  ROBAXIN   1,000 mg, Oral, 4 Times Daily PRN       oxyCODONE-acetaminophen  MG per tablet  Commonly known as:  PERCOCET   1 tablet, Oral, Every 4 Hours PRN         Changes to Medications      Instructions Start Date   oxyCODONE 10 MG 12 hr tablet  Commonly known as:  oxyCONTIN  What changed:  Another medication with the same name was added. Make sure you understand how and when to take each.   10 mg, Oral, 3 Times Daily PRN      OXYCONTIN 10 MG 12 hr tablet  Generic drug:  oxyCODONE  What changed:  You were already taking a medication with the same name, and this prescription was added. Make sure you understand how and when to take each.   20 mg, Oral, Every 12 Hours         Continue These Medications      Instructions Start Date   lisinopril 40 MG tablet  Commonly known as:  PRINIVIL,ZESTRIL   40 mg, Oral, Daily      Morphine 100 MG 12 hr tablet  Commonly known as:  MS CONTIN   100 mg, Oral, Every 12 Hours Scheduled         Stop These Medications    chlorhexidine 4 % external liquid  Commonly known as:  HIBICLENS            Follow-up Appointments  Future Appointments  Date Time Provider Department Center   11/1/2018 11:00 AM Jamir Cleaning PA-C [facility] None         Referring Provider  Victor M Han MD    PCP   Kelsey Shah APRN Christian N. Ramsey, MD  10/25/18  7:03 PM

## 2018-11-01 ENCOUNTER — OFFICE VISIT (OUTPATIENT)
Dept: NEUROSURGERY | Facility: CLINIC | Age: 63
End: 2018-11-01

## 2018-11-01 VITALS
TEMPERATURE: 97.4 F | DIASTOLIC BLOOD PRESSURE: 76 MMHG | WEIGHT: 157.2 LBS | SYSTOLIC BLOOD PRESSURE: 138 MMHG | HEIGHT: 68 IN | BODY MASS INDEX: 23.82 KG/M2

## 2018-11-01 DIAGNOSIS — M53.9 MULTILEVEL DEGENERATIVE DISC DISEASE: ICD-10-CM

## 2018-11-01 DIAGNOSIS — M48.061 SPINAL STENOSIS OF LUMBAR REGION WITHOUT NEUROGENIC CLAUDICATION: Primary | ICD-10-CM

## 2018-11-01 DIAGNOSIS — M51.36 BULGING LUMBAR DISC: ICD-10-CM

## 2018-11-01 DIAGNOSIS — M54.16 LUMBAR RADICULOPATHY: ICD-10-CM

## 2018-11-01 PROCEDURE — 99024 POSTOP FOLLOW-UP VISIT: CPT | Performed by: PHYSICIAN ASSISTANT

## 2018-11-01 NOTE — PROGRESS NOTES
Subjective   Patient ID: Gordy Veliz is a 63 y.o. male is here today for follow-up for staple removal.    HPI:      Patient is a 63-year-old gentleman with a history of chronic opioid dependence.  Patient has had recent hip surgery and required a L2 to L4 lumbar fusion.  Patient did very well postoperatively and reports complete resolution of his sciatica type pain.  Patient does have some complaints of low back pain around the surgery site.  Patient has not had any issues with the incision.    Upon inspection of the incision patient is retaining the seroma below the skin and I have educated him about this resorbing or coming out.  I have also educated him on infection.     Staples are removed without event no signs of infection bleeding or erythema.    The following portions of the patient's history were reviewed and updated as appropriate: allergies, current medications, past family history, past medical history, past social history, past surgical history and problem list.    Review of Systems   Constitutional: Negative for activity change, appetite change, chills, diaphoresis, fatigue, fever and unexpected weight change.   HENT: Negative for congestion, dental problem, drooling, ear discharge, ear pain, facial swelling, hearing loss, mouth sores, nosebleeds, postnasal drip, rhinorrhea, sinus pressure, sneezing, sore throat, tinnitus, trouble swallowing and voice change.    Eyes: Negative for photophobia, pain, discharge, redness, itching and visual disturbance.   Respiratory: Negative for apnea, cough, choking, chest tightness, shortness of breath, wheezing and stridor.    Cardiovascular: Negative for chest pain, palpitations and leg swelling.   Gastrointestinal: Negative for abdominal distention, abdominal pain, anal bleeding, blood in stool, constipation, diarrhea, nausea, rectal pain and vomiting.   Endocrine: Negative for cold intolerance, heat intolerance, polydipsia, polyphagia and polyuria.  "  Genitourinary: Negative for decreased urine volume, difficulty urinating, dysuria, enuresis, flank pain, frequency, genital sores, hematuria and urgency.   Musculoskeletal: Positive for back pain. Negative for arthralgias, gait problem, joint swelling, myalgias, neck pain and neck stiffness.   Skin: Negative for color change, pallor, rash and wound.   Allergic/Immunologic: Negative for environmental allergies, food allergies and immunocompromised state.   Neurological: Positive for weakness. Negative for dizziness, tremors, seizures, syncope, facial asymmetry, speech difficulty, light-headedness, numbness and headaches.   Hematological: Negative for adenopathy. Does not bruise/bleed easily.   Psychiatric/Behavioral: Positive for decreased concentration. Negative for agitation, behavioral problems, confusion, dysphoric mood, hallucinations, self-injury, sleep disturbance and suicidal ideas. The patient is not nervous/anxious and is not hyperactive.    All other systems reviewed and are negative.        Objective    reports that he quit smoking about 22 months ago. His smoking use included Cigarettes. He has a 45.00 pack-year smoking history. He has never used smokeless tobacco. He reports that he does not drink alcohol or use drugs.   SMOKING STATUS: Nonsmoker    Physical Exam:   Vitals:/76 (BP Location: Right arm, Patient Position: Sitting, Cuff Size: Adult)   Temp 97.4 °F (36.3 °C) (Temporal Artery )   Ht 172.7 cm (68\")   Wt 71.3 kg (157 lb 3.2 oz)   BMI 23.90 kg/m²    BMI: Body mass index is 23.9 kg/m².     GENEREAL:   The patient is in no acute distress, and is able to answer all questions appropriately.  Skin:  The incision is well-healed and well approximated.  No signs of infection, bleeding, or erythema.  Moderate-sized seroma beneath the skin.  Drain site well-healed.  Musculoskeletal: The patient’s strength is intact in upper and lower extremities upon direct testing.  Dorsiflexion and " plantarflexion are equal bilaterally @ 5/5. Hip flexion against resistance.  The patient’s gait is normal without antalgia.  Neurologic: The patient is alert and oriented by 3.  Recent memory, language, attention span, and fund of knowledge are all with within normal limits.   Sensation is equal bilaterally with no deficit.    Reflexes are 2+ at the patellar and Achilles tendon bilaterally.      Assessment/Plan   Independent Review of Radiographic Studies:    No films reviewed at this visit  Medical Decision Making:    Patient will follow-up with AP and lateral x-ray of the lumbar spine.  Patient will follow-up in 2 weeks.    At that time we will begin physical therapy.    It has been a pleasure providing neurosurgical care.    Jamir Cleaning PA-C  There are no diagnoses linked to this encounter.  No Follow-up on file.

## 2018-11-15 ENCOUNTER — HOSPITAL ENCOUNTER (OUTPATIENT)
Dept: GENERAL RADIOLOGY | Facility: HOSPITAL | Age: 63
Discharge: HOME | End: 2018-11-15
Admitting: PHYSICIAN ASSISTANT

## 2018-11-15 ENCOUNTER — OFFICE VISIT (OUTPATIENT)
Dept: NEUROSURGERY | Facility: CLINIC | Age: 63
End: 2018-11-15

## 2018-11-15 VITALS
WEIGHT: 152 LBS | HEIGHT: 68 IN | TEMPERATURE: 98 F | DIASTOLIC BLOOD PRESSURE: 86 MMHG | BODY MASS INDEX: 23.04 KG/M2 | SYSTOLIC BLOOD PRESSURE: 142 MMHG

## 2018-11-15 DIAGNOSIS — M48.061 SPINAL STENOSIS OF LUMBAR REGION WITHOUT NEUROGENIC CLAUDICATION: ICD-10-CM

## 2018-11-15 DIAGNOSIS — M53.9 MULTILEVEL DEGENERATIVE DISC DISEASE: ICD-10-CM

## 2018-11-15 DIAGNOSIS — M51.36 BULGING LUMBAR DISC: ICD-10-CM

## 2018-11-15 DIAGNOSIS — Z98.1 S/P LUMBAR FUSION: Primary | ICD-10-CM

## 2018-11-15 DIAGNOSIS — M54.16 LUMBAR RADICULOPATHY: ICD-10-CM

## 2018-11-15 PROCEDURE — 72100 X-RAY EXAM L-S SPINE 2/3 VWS: CPT

## 2018-11-15 PROCEDURE — 99024 POSTOP FOLLOW-UP VISIT: CPT | Performed by: NEUROLOGICAL SURGERY
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
## 2019-03-25 ENCOUNTER — OFFICE VISIT (OUTPATIENT)
Dept: NEUROSURGERY | Facility: CLINIC | Age: 64
End: 2019-03-25

## 2019-03-25 VITALS
HEIGHT: 68 IN | WEIGHT: 156 LBS | DIASTOLIC BLOOD PRESSURE: 68 MMHG | BODY MASS INDEX: 23.64 KG/M2 | SYSTOLIC BLOOD PRESSURE: 120 MMHG | TEMPERATURE: 98.3 F

## 2019-03-25 DIAGNOSIS — Z98.1 S/P LUMBAR FUSION: Primary | ICD-10-CM

## 2019-03-25 PROCEDURE — 99213 OFFICE O/P EST LOW 20 MIN: CPT | Performed by: PHYSICIAN ASSISTANT
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
## 2019-04-04 ENCOUNTER — APPOINTMENT (OUTPATIENT)
Dept: MRI IMAGING | Facility: HOSPITAL | Age: 64
End: 2019-04-04

## 2019-04-04 ENCOUNTER — APPOINTMENT (OUTPATIENT)
Dept: GENERAL RADIOLOGY | Facility: HOSPITAL | Age: 64
End: 2019-04-04

## 2019-04-04 ENCOUNTER — APPOINTMENT (OUTPATIENT)
Dept: CT IMAGING | Facility: HOSPITAL | Age: 64
End: 2019-04-04

## 2019-04-18 ENCOUNTER — TELEPHONE (OUTPATIENT)
Dept: NEUROSURGERY | Facility: CLINIC | Age: 64
End: 2019-04-18

## 2019-04-18 ENCOUNTER — HOSPITAL ENCOUNTER (OUTPATIENT)
Dept: CT IMAGING | Facility: HOSPITAL | Age: 64
Discharge: HOME | End: 2019-04-18
Admitting: PHYSICIAN ASSISTANT

## 2019-04-18 ENCOUNTER — OFFICE VISIT (OUTPATIENT)
Dept: NEUROSURGERY | Facility: CLINIC | Age: 64
End: 2019-04-18

## 2019-04-18 ENCOUNTER — HOSPITAL ENCOUNTER (OUTPATIENT)
Dept: GENERAL RADIOLOGY | Facility: HOSPITAL | Age: 64
Discharge: HOME | End: 2019-04-18

## 2019-04-18 ENCOUNTER — HOSPITAL ENCOUNTER (OUTPATIENT)
Dept: MRI IMAGING | Facility: HOSPITAL | Age: 64
Discharge: HOME | End: 2019-04-18

## 2019-04-18 VITALS — HEIGHT: 68 IN | WEIGHT: 156.4 LBS | RESPIRATION RATE: 17 BRPM | BODY MASS INDEX: 23.7 KG/M2

## 2019-04-18 DIAGNOSIS — I73.9 VASCULAR CLAUDICATION (HCC): ICD-10-CM

## 2019-04-18 DIAGNOSIS — Z98.1 S/P LUMBAR FUSION: ICD-10-CM

## 2019-04-18 DIAGNOSIS — I82.4Y3 DEEP VEIN THROMBOSIS (DVT) OF PROXIMAL VEIN OF BOTH LOWER EXTREMITIES, UNSPECIFIED CHRONICITY (HCC): Primary | ICD-10-CM

## 2019-04-18 DIAGNOSIS — M48.061 SPINAL STENOSIS OF LUMBAR REGION WITHOUT NEUROGENIC CLAUDICATION: ICD-10-CM

## 2019-04-18 DIAGNOSIS — M54.16 LUMBAR RADICULOPATHY: ICD-10-CM

## 2019-04-18 DIAGNOSIS — Z98.1 HISTORY OF LUMBAR FUSION: Primary | ICD-10-CM

## 2019-04-18 DIAGNOSIS — Z98.1 HISTORY OF LUMBAR FUSION: ICD-10-CM

## 2019-04-18 DIAGNOSIS — M51.36 DDD (DEGENERATIVE DISC DISEASE), LUMBAR: ICD-10-CM

## 2019-04-18 PROCEDURE — 72158 MRI LUMBAR SPINE W/O & W/DYE: CPT

## 2019-04-18 PROCEDURE — 72120 X-RAY BEND ONLY L-S SPINE: CPT

## 2019-04-18 PROCEDURE — 0 GADOBENATE DIMEGLUMINE 529 MG/ML SOLUTION: Performed by: PHYSICIAN ASSISTANT

## 2019-04-18 PROCEDURE — 99214 OFFICE O/P EST MOD 30 MIN: CPT | Performed by: NEUROLOGICAL SURGERY

## 2019-04-18 PROCEDURE — 82565 ASSAY OF CREATININE: CPT

## 2019-04-18 PROCEDURE — A9577 INJ MULTIHANCE: HCPCS | Performed by: PHYSICIAN ASSISTANT

## 2019-04-18 PROCEDURE — 72131 CT LUMBAR SPINE W/O DYE: CPT

## 2019-04-18 RX ADMIN — GADOBENATE DIMEGLUMINE 15 ML: 529 INJECTION, SOLUTION INTRAVENOUS at 11:17

## 2019-04-18 NOTE — TELEPHONE ENCOUNTER
Gabapentin called in to pt's pharmacy per Dr. Han   300 qhs x 3 days  300 bid x 3 days  300 tid thereafter  #90 1 RF.    3:24PM
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
## 2019-04-19 LAB — CREAT BLDA-MCNC: 0.8 MG/DL (ref 0.6–1.3)

## 2019-05-02 ENCOUNTER — OFFICE VISIT (OUTPATIENT)
Dept: NEUROSURGERY | Facility: CLINIC | Age: 64
End: 2019-05-02

## 2019-05-02 ENCOUNTER — HOSPITAL ENCOUNTER (OUTPATIENT)
Dept: CARDIOLOGY | Facility: HOSPITAL | Age: 64
Discharge: HOME | End: 2019-05-02
Admitting: NEUROLOGICAL SURGERY

## 2019-05-02 ENCOUNTER — HOSPITAL ENCOUNTER (OUTPATIENT)
Dept: GENERAL RADIOLOGY | Facility: HOSPITAL | Age: 64
Discharge: HOME | End: 2019-05-02

## 2019-05-02 VITALS
TEMPERATURE: 97.5 F | SYSTOLIC BLOOD PRESSURE: 122 MMHG | DIASTOLIC BLOOD PRESSURE: 62 MMHG | HEIGHT: 68 IN | BODY MASS INDEX: 24.04 KG/M2 | WEIGHT: 158.6 LBS

## 2019-05-02 VITALS — WEIGHT: 156 LBS | BODY MASS INDEX: 23.64 KG/M2 | HEIGHT: 68 IN

## 2019-05-02 DIAGNOSIS — Z98.1 HISTORY OF LUMBAR FUSION: ICD-10-CM

## 2019-05-02 DIAGNOSIS — I73.9 VASCULAR CLAUDICATION (HCC): Primary | ICD-10-CM

## 2019-05-02 DIAGNOSIS — I82.4Y3 DEEP VEIN THROMBOSIS (DVT) OF PROXIMAL VEIN OF BOTH LOWER EXTREMITIES, UNSPECIFIED CHRONICITY (HCC): ICD-10-CM

## 2019-05-02 LAB
BH CV LOWER VASCULAR LEFT COMMON FEMORAL AUGMENT: NORMAL
BH CV LOWER VASCULAR LEFT COMMON FEMORAL COMPRESS: NORMAL
BH CV LOWER VASCULAR LEFT COMMON FEMORAL PHASIC: NORMAL
BH CV LOWER VASCULAR LEFT COMMON FEMORAL SPONT: NORMAL
BH CV LOWER VASCULAR LEFT DISTAL FEMORAL COMPRESS: NORMAL
BH CV LOWER VASCULAR LEFT GASTRONEMIUS COMPRESS: NORMAL
BH CV LOWER VASCULAR LEFT GREATER SAPH AK COMPRESS: NORMAL
BH CV LOWER VASCULAR LEFT GREATER SAPH BK COMPRESS: NORMAL
BH CV LOWER VASCULAR LEFT LESSER SAPH COMPRESS: NORMAL
BH CV LOWER VASCULAR LEFT MID FEMORAL AUGMENT: NORMAL
BH CV LOWER VASCULAR LEFT MID FEMORAL COMPRESS: NORMAL
BH CV LOWER VASCULAR LEFT MID FEMORAL PHASIC: NORMAL
BH CV LOWER VASCULAR LEFT MID FEMORAL SPONT: NORMAL
BH CV LOWER VASCULAR LEFT PERONEAL COMPRESS: NORMAL
BH CV LOWER VASCULAR LEFT POPLITEAL AUGMENT: NORMAL
BH CV LOWER VASCULAR LEFT POPLITEAL COMPRESS: NORMAL
BH CV LOWER VASCULAR LEFT POPLITEAL PHASIC: NORMAL
BH CV LOWER VASCULAR LEFT POPLITEAL SPONT: NORMAL
BH CV LOWER VASCULAR LEFT POSTERIOR TIBIAL COMPRESS: NORMAL
BH CV LOWER VASCULAR LEFT PROFUNDA FEMORAL AUGMENT: NORMAL
BH CV LOWER VASCULAR LEFT PROFUNDA FEMORAL COMPRESS: NORMAL
BH CV LOWER VASCULAR LEFT PROFUNDA FEMORAL PHASIC: NORMAL
BH CV LOWER VASCULAR LEFT PROFUNDA FEMORAL SPONT: NORMAL
BH CV LOWER VASCULAR LEFT PROXIMAL FEMORAL COMPRESS: NORMAL
BH CV LOWER VASCULAR LEFT SAPHENOFEMORAL JUNCTION AUGMENT: NORMAL
BH CV LOWER VASCULAR LEFT SAPHENOFEMORAL JUNCTION COMPRESS: NORMAL
BH CV LOWER VASCULAR LEFT SAPHENOFEMORAL JUNCTION PHASIC: NORMAL
BH CV LOWER VASCULAR LEFT SAPHENOFEMORAL JUNCTION SPONT: NORMAL
BH CV LOWER VASCULAR RIGHT COMMON FEMORAL AUGMENT: NORMAL
BH CV LOWER VASCULAR RIGHT COMMON FEMORAL COMPRESS: NORMAL
BH CV LOWER VASCULAR RIGHT COMMON FEMORAL PHASIC: NORMAL
BH CV LOWER VASCULAR RIGHT COMMON FEMORAL SPONT: NORMAL
BH CV LOWER VASCULAR RIGHT DISTAL FEMORAL COMPRESS: NORMAL
BH CV LOWER VASCULAR RIGHT GASTRONEMIUS COMPRESS: NORMAL
BH CV LOWER VASCULAR RIGHT GREATER SAPH AK COMPRESS: NORMAL
BH CV LOWER VASCULAR RIGHT GREATER SAPH BK COMPRESS: NORMAL
BH CV LOWER VASCULAR RIGHT LESSER SAPH COMPRESS: NORMAL
BH CV LOWER VASCULAR RIGHT MID FEMORAL AUGMENT: NORMAL
BH CV LOWER VASCULAR RIGHT MID FEMORAL COMPRESS: NORMAL
BH CV LOWER VASCULAR RIGHT MID FEMORAL PHASIC: NORMAL
BH CV LOWER VASCULAR RIGHT MID FEMORAL SPONT: NORMAL
BH CV LOWER VASCULAR RIGHT PERONEAL COMPRESS: NORMAL
BH CV LOWER VASCULAR RIGHT POPLITEAL AUGMENT: NORMAL
BH CV LOWER VASCULAR RIGHT POPLITEAL COMPRESS: NORMAL
BH CV LOWER VASCULAR RIGHT POPLITEAL PHASIC: NORMAL
BH CV LOWER VASCULAR RIGHT POPLITEAL SPONT: NORMAL
BH CV LOWER VASCULAR RIGHT POSTERIOR TIBIAL COMPRESS: NORMAL
BH CV LOWER VASCULAR RIGHT PROFUNDA FEMORAL COMPRESS: NORMAL
BH CV LOWER VASCULAR RIGHT PROXIMAL FEMORAL COMPRESS: NORMAL
BH CV LOWER VASCULAR RIGHT SAPHENOFEMORAL JUNCTION AUGMENT: NORMAL
BH CV LOWER VASCULAR RIGHT SAPHENOFEMORAL JUNCTION COMPRESS: NORMAL
BH CV LOWER VASCULAR RIGHT SAPHENOFEMORAL JUNCTION PHASIC: NORMAL
BH CV LOWER VASCULAR RIGHT SAPHENOFEMORAL JUNCTION SPONT: NORMAL

## 2019-05-02 PROCEDURE — 93970 EXTREMITY STUDY: CPT | Performed by: INTERNAL MEDICINE

## 2019-05-02 PROCEDURE — 93970 EXTREMITY STUDY: CPT

## 2019-05-02 PROCEDURE — 99214 OFFICE O/P EST MOD 30 MIN: CPT | Performed by: PHYSICIAN ASSISTANT

## 2019-05-02 PROCEDURE — 72120 X-RAY BEND ONLY L-S SPINE: CPT

## 2019-05-16 ENCOUNTER — HOSPITAL ENCOUNTER (OUTPATIENT)
Dept: CARDIOLOGY | Facility: HOSPITAL | Age: 64
Discharge: HOME | End: 2019-05-16
Admitting: PHYSICIAN ASSISTANT

## 2019-05-16 ENCOUNTER — OFFICE VISIT (OUTPATIENT)
Dept: NEUROSURGERY | Facility: CLINIC | Age: 64
End: 2019-05-16

## 2019-05-16 VITALS
HEIGHT: 68 IN | SYSTOLIC BLOOD PRESSURE: 110 MMHG | WEIGHT: 156.2 LBS | BODY MASS INDEX: 23.67 KG/M2 | DIASTOLIC BLOOD PRESSURE: 70 MMHG | TEMPERATURE: 97.9 F

## 2019-05-16 DIAGNOSIS — I73.9 RIGHT LEG CLAUDICATION (HCC): Primary | ICD-10-CM

## 2019-05-16 DIAGNOSIS — I73.9 VASCULAR CLAUDICATION (HCC): ICD-10-CM

## 2019-05-16 LAB
BH CV LOWER ARTERIAL LEFT ABI RATIO: 0.93
BH CV LOWER ARTERIAL LEFT DORSALIS PEDIS SYS MAX: 134 MMHG
BH CV LOWER ARTERIAL LEFT POST TIBIAL SYS MAX: 165 MMHG
BH CV LOWER ARTERIAL RIGHT ABI RATIO: 0.41
BH CV LOWER ARTERIAL RIGHT DORSALIS PEDIS SYS MAX: 66 MMHG
BH CV LOWER ARTERIAL RIGHT POST TIBIAL SYS MAX: 72 MMHG
UPPER ARTERIAL LEFT ARM BRACHIAL SYS MAX: 170 MMHG
UPPER ARTERIAL RIGHT ARM BRACHIAL SYS MAX: 177 MMHG

## 2019-05-16 PROCEDURE — 99213 OFFICE O/P EST LOW 20 MIN: CPT | Performed by: PHYSICIAN ASSISTANT

## 2019-05-16 PROCEDURE — 93922 UPR/L XTREMITY ART 2 LEVELS: CPT

## 2019-05-16 PROCEDURE — 93922 UPR/L XTREMITY ART 2 LEVELS: CPT | Performed by: INTERNAL MEDICINE
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
## 2019-06-11 ENCOUNTER — OFFICE VISIT (OUTPATIENT)
Dept: CARDIAC SURGERY | Facility: CLINIC | Age: 64
End: 2019-06-11

## 2019-06-11 VITALS
BODY MASS INDEX: 23.19 KG/M2 | OXYGEN SATURATION: 99 % | HEIGHT: 68 IN | TEMPERATURE: 98 F | WEIGHT: 153 LBS | HEART RATE: 57 BPM | SYSTOLIC BLOOD PRESSURE: 140 MMHG | DIASTOLIC BLOOD PRESSURE: 60 MMHG

## 2019-06-11 DIAGNOSIS — I73.9 PERIPHERAL VASCULAR DISEASE WITH PAIN AT REST (HCC): Primary | ICD-10-CM

## 2019-06-11 DIAGNOSIS — I70.221 ATHEROSCLEROSIS OF NATIVE ARTERY OF RIGHT LOWER EXTREMITY WITH REST PAIN (HCC): ICD-10-CM

## 2019-06-11 DIAGNOSIS — I73.9 VASCULAR CLAUDICATION (HCC): ICD-10-CM

## 2019-06-11 PROCEDURE — 99203 OFFICE O/P NEW LOW 30 MIN: CPT | Performed by: THORACIC SURGERY (CARDIOTHORACIC VASCULAR SURGERY)

## 2019-06-11 RX ORDER — ASPIRIN 325 MG
325 TABLET ORAL DAILY
COMMUNITY
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
## 2019-06-14 ENCOUNTER — HOSPITAL ENCOUNTER (OUTPATIENT)
Dept: CT IMAGING | Facility: HOSPITAL | Age: 64
Discharge: HOME | End: 2019-06-14
Admitting: THORACIC SURGERY (CARDIOTHORACIC VASCULAR SURGERY)

## 2019-06-14 LAB — CREAT BLDA-MCNC: 0.9 MG/DL (ref 0.6–1.3)

## 2019-06-14 PROCEDURE — 75635 CT ANGIO ABDOMINAL ARTERIES: CPT

## 2019-06-14 PROCEDURE — 82565 ASSAY OF CREATININE: CPT

## 2019-06-14 PROCEDURE — 0 IOPAMIDOL PER 1 ML: Performed by: THORACIC SURGERY (CARDIOTHORACIC VASCULAR SURGERY)

## 2019-06-14 RX ADMIN — IOPAMIDOL 135 ML: 755 INJECTION, SOLUTION INTRAVENOUS at 09:00

## 2019-06-18 ENCOUNTER — OFFICE VISIT (OUTPATIENT)
Dept: CARDIAC SURGERY | Facility: CLINIC | Age: 64
End: 2019-06-18

## 2019-06-18 VITALS
BODY MASS INDEX: 23.31 KG/M2 | TEMPERATURE: 97.8 F | HEIGHT: 68 IN | HEART RATE: 54 BPM | WEIGHT: 153.8 LBS | SYSTOLIC BLOOD PRESSURE: 130 MMHG | DIASTOLIC BLOOD PRESSURE: 70 MMHG | OXYGEN SATURATION: 98 %

## 2019-06-18 DIAGNOSIS — I73.9 PERIPHERAL VASCULAR DISEASE WITH PAIN AT REST (HCC): Primary | ICD-10-CM

## 2019-06-18 PROCEDURE — 99213 OFFICE O/P EST LOW 20 MIN: CPT | Performed by: PHYSICIAN ASSISTANT

## 2019-06-18 NOTE — PROGRESS NOTES
"06/18/2019  Patient Information  Gordy Veliz                                                                                          [address] [place] [address]   1955  'PCP/Referring Physician'  PabloKelsey, APRN  [phone]  No ref. provider found    Chief Complaint   Patient presents with   • Follow-up     pt cannot sleep bc foot and toe pain. pt 4th toe on right foor has an opened wound with drainage. pt states the pain first started on 3-3-19 and has been happening every since, pt right foot is swollen and discolored a little. pt states he cannot take this pain anymore and is concerned about Gangreen.   • Peripheral Vascular Disease     pt right leg has 2 wounds that wont heal as well. right leg is always colder, skin on right foot is pealing as well. states  \"tissue is dying \"       History of Present Illness:  Patient is a 63-year-old  male with history of spinal stenosis, lumbar radiculopathy, multilevel degenerative disc disease, spondylolysis, anxiety, remote tobacco abuse and peripheral vascular disease who presents to office today for review and discussion of recent CTA with runoff performed 6/14/2019 for evaluation of worsening claudication pain.  The patient was last seen on 6/11/2019 and complains of progressively worsening claudication pain since his last appointment.  He states that the pain is now constant and severe, and he is having difficulty walking secondary to the pain.  He states that all the pain is located in his right lower extremity, and he does not experience any pain in his left lower extremity.  He states that with extended ambulation, he develops pain radiation up his right calf, as well as easy fatigability of his right lower extremity.  The patient does have an ulceration present under his right fourth toe, and states that it is increasing in size.    Patient Active Problem List   Diagnosis   • Multilevel degenerative disc disease   • Bulging lumbar " disc   • Anxiety   • Spinal stenosis of lumbar region without neurogenic claudication   • Lumbar radiculopathy   • Scoliosis (and kyphoscoliosis), idiopathic   • Spondylolisthesis of lumbar region   • Acquired spondylolysis   • S/P lumbar fusion   • Vascular claudication (CMS/HCC)   • Peripheral vascular disease with pain at rest (CMS/HCC)     Past Medical History:   Diagnosis Date   • Arrhythmia 1998   • Arthritis    • COPD (chronic obstructive pulmonary disease) (CMS/HCC)     very mild due to history of smoking    • Hyperlipidemia    • Hypertension    • Low back pain    • Peripheral vascular disease (CMS/HCC)    • Rupture of eye     left-macular-no treatment or surgery    • Wears glasses      Past Surgical History:   Procedure Laterality Date   • COLONOSCOPY  2003   • LUMBAR DISC SURGERY  1999    [facility] ()   • LUMBAR DISCECTOMY FUSION INSTRUMENTATION N/A 10/17/2018    Procedure: LUMBAR LAMINECTOMY POSTERIOR LUMBAR INTERBODY FUSION L2, L3, L4 FOR LEFT INTRAFORAMINAL DISEASE;  Surgeon: Victor M Han MD;  Location:  [facility] OR;  Service: Neurosurgery   • [place] MYELOGRAPHY VIA LUMBAR INJECT RS&I LUMBOSACRAL N/A 7/20/2018    Procedure: IR myelogram, lumbar;  Surgeon: Robert Mullins MD;  Location:  [facility] CATH INVASIVE LOCATION;  Service: Interventional Radiology   • SKIN BIOPSY Right     rt arm-benign   • TONSILLECTOMY     • TOTAL HIP ARTHROPLASTY Right 02/14/2018    [place]       Current Outpatient Medications:   •  lisinopril (PRINIVIL,ZESTRIL) 40 MG tablet, Take 40 mg by mouth Daily., Disp: , Rfl: 1  •  Morphine (MS CONTIN) 100 MG 12 hr tablet, Take 100 mg by mouth Every 12 (Twelve) Hours., Disp: , Rfl:   •  oxyCODONE (oxyCONTIN) 10 MG 12 hr tablet, Take 10 mg by mouth 3 (Three) Times a Day As Needed., Disp: , Rfl:   •  aspirin 325 MG tablet, Take 325 mg by mouth Daily., Disp: , Rfl:   No Known Allergies  Social History     Socioeconomic History   • Marital status:       "Spouse name: Not on file   • Number of children: 2   • Years of education: Not on file   • Highest education level: Not on file   Occupational History   • Occupation: Construction     Comment: Disabled-Back   Tobacco Use   • Smoking status: Former Smoker     Packs/day: 1.00     Years: 45.00     Pack years: 45.00     Types: Cigarettes     Last attempt to quit: 2017     Years since quittin.4   • Smokeless tobacco: Never Used   Substance and Sexual Activity   • Alcohol use: No     Comment: \"Moderately\" socially    • Drug use: No   • Sexual activity: Defer   Social History Narrative    Lives in [place].     Family History   Problem Relation Age of Onset   • Heart attack Mother    • Cancer Father      Review of Systems   Constitution: Positive for malaise/fatigue and night sweats. Negative for chills, diaphoresis, fever, weakness, weight gain and weight loss.   HENT: Negative for congestion, ear pain, hearing loss, nosebleeds and sore throat.    Eyes: Negative for blurred vision and double vision.   Cardiovascular: Positive for leg swelling (right leg and foot). Negative for chest pain, claudication, dyspnea on exertion, near-syncope, palpitations and syncope.   Respiratory: Negative for cough, hemoptysis, shortness of breath and wheezing.    Hematologic/Lymphatic: Does not bruise/bleed easily.   Skin: Positive for poor wound healing (right leg and right foot). Negative for itching and rash.   Musculoskeletal: Negative for arthritis, back pain, falls, joint pain, joint swelling, muscle cramps, muscle weakness and neck pain.   Gastrointestinal: Positive for abdominal pain (when taking aspirin). Negative for constipation, diarrhea, dysphagia, hematochezia, nausea and vomiting.   Genitourinary: Negative for frequency, hematuria, hesitancy, incomplete emptying and urgency.   Neurological: Positive for numbness (right foot). Negative for dizziness, headaches, light-headedness, loss of balance, seizures and tremors. " "  Psychiatric/Behavioral: Negative for depression and suicidal ideas. The patient is not nervous/anxious.    Allergic/Immunologic: Negative for environmental allergies and HIV exposure.     Vitals:    19 1047   BP: 130/70   BP Location: Right arm   Patient Position: Sitting   Pulse: 54   Temp: 97.8 °F (36.6 °C)   SpO2: 98%   Weight: 69.8 kg (153 lb 12.8 oz)   Height: 172.7 cm (68\")      Physical Exam:  Gen - NAD, pleasant, cooperative  CV - Regular rate and rhythm, no murmur gallop or rub  Pulm - Lungs clear to auscultation without wheeze or rhonchi   GI - Soft, normoactive bowel sounds, non-tender  Ext - Without edema, 1 cm shallow ulceration present under the right fourth toe, without evidence of erythema or cellulitis, feet are warm to touch  Pulses - I was able to palpate dorsalis pedis pulse on the right foot, left lower extreme he pulses intact  Incision - Healing well, no evidence of incisional dehiscence or cellulitis  Neuro - CN II - XII grossly intact, tongue midline, voice normal    Labs/Imagin2019 CTA runoff  1. Inflow to the level of the common femoral arteries demonstrates only  mild stenosis.  2. There is dense calcification of both common superficial femoral  arteries, right more so than the left. There are also focal calcific  areas of stenosis in the superficial femoral arteries with high-grade  near total occlusion of the proximal popliteal arteries. There is  significant trifurcation disease on the right. On the left the  trifurcation vessels are patent albeit attenuated.    Assessment/Plan:  Patient is a 63-year-old  male with history of spinal stenosis, lumbar radiculopathy, multilevel degenerative disc disease, spondylolysis, anxiety, remote tobacco abuse and peripheral vascular disease who presents to office today for review and discussion of recent CTA with runoff performed 2019 for evaluation of worsening claudication pain.  I personally reviewed the patient's " recent CTA with runoff alone and with Dr. Sterling Eisenberg, and discussed the results with the patient, answering all questions to his satisfaction.  I explained to the patient that surgery is not a viable option due to his multilevel occlusive peripheral vascular disease.  I believe at this point, medical therapy would be the most prudent course of action.  I will prescribe the patient Pletal, and will defer statin and antiplatelet therapy to his primary care provider.  I encouraged the patient to continue ambulating, at least 3 times per day, to help increase peripheral vascular blood flow.  I instructed the patient to apply Betadine to his right fourth toe ulceration, and to keep it dry and not apply any creams or salves to this area.  I did discuss the possibility of future amputation, should his ulceration worsen and possibly cause osteomyelitis.  The patient was instructed to wear thick socks and comfortable shoes while ambulating, and to not walk barefoot.  I did notice during the physical examination of the patient was hyperextending his toe, in order to evaluate the ulcer beneath it.  I instructed the patient to discontinue doing this, as he is sabotaging the healing process and increasing the size of his ulcer.  Should the patient's right lower extremity develop any redness, purulent discharge or he should develop any fever chills - he will need to call our office or present to the nearest emergency department immediately.  I would like for the patient to follow-up in 3 months for continued evaluation.  If he has any questions, concerns or acutely worsening symptoms he may call our office at any time.    Patient Active Problem List   Diagnosis   • Multilevel degenerative disc disease   • Bulging lumbar disc   • Anxiety   • Spinal stenosis of lumbar region without neurogenic claudication   • Lumbar radiculopathy   • Scoliosis (and kyphoscoliosis), idiopathic   • Spondylolisthesis of lumbar region   • Acquired  spondylolysis   • S/P lumbar fusion   • Vascular claudication (CMS/HCC)   • Peripheral vascular disease with pain at rest (CMS/HCC)

## 2019-06-19 ENCOUNTER — TELEPHONE (OUTPATIENT)
Dept: CARDIAC SURGERY | Facility: CLINIC | Age: 64
End: 2019-06-19

## 2019-06-19 NOTE — TELEPHONE ENCOUNTER
Mr. Veliz called stating that you were going to prescribe 2 new medications for him. A statin and a non-narcotic for his lower extremity pain. His pharmacy does not have these prescriptions.     MARICHUY Wade sent prescription for Pletal to pharmacy. Statin will need to come from PCP or cardiologist. Patient is aware.

## 2019-06-21 RX ORDER — CILOSTAZOL 100 MG/1
100 TABLET ORAL
Qty: 60 TABLET | Refills: 11 | Status: SHIPPED | OUTPATIENT
Start: 2019-06-21 | End: 2020-06-20

## 2019-09-26 ENCOUNTER — TELEPHONE (OUTPATIENT)
Dept: CARDIAC SURGERY | Facility: CLINIC | Age: 64
End: 2019-09-26

## 2019-09-30 ENCOUNTER — TELEPHONE (OUTPATIENT)
Dept: CARDIAC SURGERY | Facility: CLINIC | Age: 64
End: 2019-09-30

## 2019-09-30 NOTE — TELEPHONE ENCOUNTER
PT HAS RECEIVED RECALL LETTER PER [facility] FOR F/U AND POSS TEST, PT HAS CALLED OFFICE STATING THAT HE WISHED TO NOT MAKE AN APPT WITH OUR OFFICE. WISHES TO SEE A DIFFERENT SURGEON. WILL CX RECALL FOR PT

## 2021-06-16 ENCOUNTER — TELEPHONE (OUTPATIENT)
Dept: NEUROSURGERY | Facility: CLINIC | Age: 66
End: 2021-06-16

## 2021-06-16 NOTE — TELEPHONE ENCOUNTER
Can go ahead and schedule patient with Jamir for further evaluation if he does not need a new referral.    In regards to pain medication, he needs to obtain these from his PCP.

## 2021-06-16 NOTE — TELEPHONE ENCOUNTER
Provider: Guille  Caller: shala  Time of call:  3748   Phone #:  [phone]  Surgery: lumbar laminectomy  Surgery Date:  10/17/2018  Last visit:  5-   Next visit: pedro MCKEON:     Reason for call: pt said out of no where he started hurting in his lower left back. He went and had a MRI and wants an appointment to come back in as soon as possible. His strong pain medicine is not helping. Please Advise. Thank you.    When did it start:middle of May    Where is it located:lower left back    How long has this been going on/is this new or the same as before surgery: 6 weeks ago    Characteristics of symptom/severity:sharp pain    Timing- Is it constant or intermittent:constant  What makes it worse:moving    What makes it better:nothing    What therapies/medications have you tried: oxycodone and morphine

## 2021-06-16 NOTE — TELEPHONE ENCOUNTER
Caller: Gordy Veliz    Relationship: Self    Best call back number:[phone]    What is the medical concern/diagnosis: ABN MRI  LOW BACK PAIN     What specialty or service is being requested: NEW APPT     What is the provider, practice or medical service name: [facility]     What is the office location: [place]    What is the office phone number: [phone]    Any additional details: PATIENT NEEDS A NEW APPT, WAS LAST SEEN BY  5/16/19, PER LAST OV FROM FROM [facility]. PATIENT HAS BEEN TO SEE  AND DONE EVERYTHING REQUESTED. PLEASE ADVISE    CALLED REF'ING TO GET MRI RESULTS, WILL UPDATE ONCE RECEIVED. NEW OV NOTES FROM REF' IN CHART.

## 2021-07-29 ENCOUNTER — OFFICE VISIT (OUTPATIENT)
Dept: NEUROSURGERY | Facility: CLINIC | Age: 66
End: 2021-07-29

## 2021-07-29 VITALS
DIASTOLIC BLOOD PRESSURE: 72 MMHG | HEIGHT: 68 IN | SYSTOLIC BLOOD PRESSURE: 156 MMHG | BODY MASS INDEX: 23.61 KG/M2 | WEIGHT: 155.8 LBS | TEMPERATURE: 96.9 F

## 2021-07-29 DIAGNOSIS — M43.00 ACQUIRED SPONDYLOLYSIS: ICD-10-CM

## 2021-07-29 DIAGNOSIS — M43.16 SPONDYLOLISTHESIS OF LUMBAR REGION: ICD-10-CM

## 2021-07-29 DIAGNOSIS — Z98.1 S/P LUMBAR FUSION: Primary | ICD-10-CM

## 2021-07-29 PROCEDURE — 99214 OFFICE O/P EST MOD 30 MIN: CPT | Performed by: PHYSICIAN ASSISTANT

## 2021-07-29 RX ORDER — METOPROLOL SUCCINATE 100 MG/1
TABLET, EXTENDED RELEASE ORAL
COMMUNITY
Start: 2021-05-25

## 2021-07-29 RX ORDER — CILOSTAZOL 100 MG/1
100 TABLET ORAL 2 TIMES DAILY
COMMUNITY
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
## 2021-09-02 ENCOUNTER — OFFICE VISIT (OUTPATIENT)
Dept: NEUROSURGERY | Facility: CLINIC | Age: 66
End: 2021-09-02

## 2021-09-02 VITALS — WEIGHT: 155 LBS | BODY MASS INDEX: 23.49 KG/M2 | HEIGHT: 68 IN

## 2021-09-02 DIAGNOSIS — Z98.1 S/P LUMBAR FUSION: ICD-10-CM

## 2021-09-02 DIAGNOSIS — M43.00 ACQUIRED SPONDYLOLYSIS: Primary | ICD-10-CM

## 2021-09-02 DIAGNOSIS — M48.061 SPINAL STENOSIS OF LUMBAR REGION WITHOUT NEUROGENIC CLAUDICATION: ICD-10-CM

## 2021-09-02 PROCEDURE — 99213 OFFICE O/P EST LOW 20 MIN: CPT | Performed by: PHYSICIAN ASSISTANT
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
## (undated) DEVICE — C-ARM: Brand: UNBRANDED

## (undated) DEVICE — MEDI-VAC NON-CONDUCTIVE SUCTION TUBING: Brand: CARDINAL HEALTH

## (undated) DEVICE — TRAP,MUCUS SPECIMEN,40CC: Brand: MEDLINE

## (undated) DEVICE — NDL HYPO ECLPS SFTY 25G 1 1/2IN

## (undated) DEVICE — GLV SURG BIOGEL LTX PF 8

## (undated) DEVICE — SUT VIC 0 UR6 27IN VCP603H

## (undated) DEVICE — ANTIBACTERIAL UNDYED BRAIDED (POLYGLACTIN 910), SYNTHETIC ABSORBABLE SUTURE: Brand: COATED VICRYL

## (undated) DEVICE — ELECTRD BLD EXT EDGE/INSUL 1P 4IN

## (undated) DEVICE — 3M™ STERI-DRAPE™ INSTRUMENT POUCH 1018: Brand: STERI-DRAPE™

## (undated) DEVICE — SYR CONTRL LUERLOK 10CC

## (undated) DEVICE — CANNULA,OXY,ADULT,SUPERSOFT,W/7'TUB,UC: Brand: MEDLINE

## (undated) DEVICE — TOOL 14BA60 LEGEND 14CM 6MM: Brand: MIDAS REX ™

## (undated) DEVICE — NDL SPINE 22G 31/2IN BLK

## (undated) DEVICE — SPHR MARKR STEALTH STATION

## (undated) DEVICE — PK NEURO DISC 10

## (undated) DEVICE — DRP MICROSCOP ELIMINATES GLAS COVR

## (undated) DEVICE — PACK,UNIVERSAL,NO GOWNS: Brand: MEDLINE

## (undated) DEVICE — 3M™ STERI-STRIP™ REINFORCED ADHESIVE SKIN CLOSURES, R1547, 1/2 IN X 4 IN (12 MM X 100 MM), 6 STRIPS/ENVELOPE: Brand: 3M™ STERI-STRIP™

## (undated) DEVICE — INTENDED USE FOR SURGICAL MARKING ON INTACT SKIN, ALSO PROVIDES A PERMANENT METHOD OF IDENTIFYING OBJECTS IN THE OPERATING ROOM: Brand: WRITESITE® REGULAR TIP SKIN MARKER

## (undated) DEVICE — TRY L/P SFTY A/20G

## (undated) DEVICE — ST PRIM GRVTY NDLESS 3 INJ PORT 105IN

## (undated) DEVICE — MAGNETIC DRAPE: Brand: DEVON

## (undated) DEVICE — COVER,LIGHT HANDLE,FLX,1/PK: Brand: MEDLINE INDUSTRIES, INC.

## (undated) DEVICE — APPL CHLORAPREP W/TINT 26ML BLU

## (undated) DEVICE — TOOL 14MH30 LEGEND 14CM 3MM: Brand: MIDAS REX ™

## (undated) DEVICE — ADHS LIQ MASTISOL 2/3ML

## (undated) DEVICE — MEDI-VAC YANKAUER SUCTION HANDLE W/BULBOUS TIP: Brand: CARDINAL HEALTH

## (undated) DEVICE — 2963 MEDIPORE SOFT CLOTH TAPE 3 IN X 10 YD 12 RLS/CS: Brand: 3M™ MEDIPORE™

## (undated) DEVICE — NEURO SPONGES: Brand: DEROYAL

## (undated) DEVICE — Device

## (undated) DEVICE — SHEET,DRAPE,40X58,STERILE: Brand: MEDLINE

## (undated) DEVICE — AIRWY SZ11

## (undated) DEVICE — ELECTRD BLD EXT EDGE/INSUL 6IN

## (undated) DEVICE — DRSNG WND GZ PAD BORDERED 4X8IN STRL

## (undated) DEVICE — SOL LR 1000ML

## (undated) DEVICE — ENCORE® LATEX MICRO SIZE 8, STERILE LATEX POWDER-FREE SURGICAL GLOVE: Brand: ENCORE